# Patient Record
Sex: FEMALE | HISPANIC OR LATINO | Employment: OTHER
[De-identification: names, ages, dates, MRNs, and addresses within clinical notes are randomized per-mention and may not be internally consistent; named-entity substitution may affect disease eponyms.]

---

## 2017-01-27 ENCOUNTER — HOSPITAL (OUTPATIENT)
Dept: OTHER | Age: 62
End: 2017-01-27
Attending: INTERNAL MEDICINE

## 2017-02-04 ENCOUNTER — HOSPITAL (OUTPATIENT)
Dept: OTHER | Age: 62
End: 2017-02-04
Attending: FAMILY MEDICINE

## 2017-02-15 ENCOUNTER — HOSPITAL (OUTPATIENT)
Dept: OTHER | Age: 62
End: 2017-02-15
Attending: FAMILY MEDICINE

## 2017-04-11 ENCOUNTER — HOSPITAL (OUTPATIENT)
Dept: OTHER | Age: 62
End: 2017-04-11
Attending: FAMILY MEDICINE

## 2017-07-29 ENCOUNTER — HOSPITAL (OUTPATIENT)
Dept: OTHER | Age: 62
End: 2017-07-29
Attending: FAMILY MEDICINE

## 2017-10-18 ENCOUNTER — CHARTING TRANS (OUTPATIENT)
Dept: OTHER | Age: 62
End: 2017-10-18

## 2017-10-18 ASSESSMENT — PAIN SCALES - GENERAL: PAINLEVEL_OUTOF10: 0

## 2017-11-17 ENCOUNTER — IMAGING SERVICES (OUTPATIENT)
Dept: OTHER | Age: 62
End: 2017-11-17

## 2017-11-17 ENCOUNTER — HOSPITAL (OUTPATIENT)
Dept: OTHER | Age: 62
End: 2017-11-17
Attending: SURGERY

## 2017-11-22 ENCOUNTER — IMAGING SERVICES (OUTPATIENT)
Dept: OTHER | Age: 62
End: 2017-11-22

## 2017-11-22 ENCOUNTER — HOSPITAL (OUTPATIENT)
Dept: OTHER | Age: 62
End: 2017-11-22
Attending: SURGERY

## 2017-11-29 ENCOUNTER — HOSPITAL (OUTPATIENT)
Dept: OTHER | Age: 62
End: 2017-11-29
Attending: RADIOLOGY

## 2017-11-29 ENCOUNTER — CHARTING TRANS (OUTPATIENT)
Dept: OTHER | Age: 62
End: 2017-11-29

## 2017-11-29 ASSESSMENT — PAIN SCALES - GENERAL: PAINLEVEL_OUTOF10: 0

## 2017-12-01 ENCOUNTER — HOSPITAL (OUTPATIENT)
Dept: OTHER | Age: 62
End: 2017-12-01
Attending: INTERNAL MEDICINE

## 2017-12-01 ENCOUNTER — IMAGING SERVICES (OUTPATIENT)
Dept: OTHER | Age: 62
End: 2017-12-01

## 2017-12-01 ENCOUNTER — LAB SERVICES (OUTPATIENT)
Dept: OTHER | Age: 62
End: 2017-12-01

## 2017-12-01 ENCOUNTER — HOSPITAL (OUTPATIENT)
Dept: OTHER | Age: 62
End: 2017-12-01
Attending: SURGERY

## 2017-12-01 LAB
CREAT BLD-MCNC: 0.7 MG/DL (ref 0.51–0.95)
CREATININE, POC: 0.7 MG/DL (ref 0.51–0.95)

## 2017-12-06 ENCOUNTER — CHARTING TRANS (OUTPATIENT)
Dept: OTHER | Age: 62
End: 2017-12-06

## 2017-12-08 ENCOUNTER — PRIOR ORIGINAL RECORDS (OUTPATIENT)
Dept: OTHER | Age: 62
End: 2017-12-08

## 2017-12-14 ENCOUNTER — IMAGING SERVICES (OUTPATIENT)
Dept: OTHER | Age: 62
End: 2017-12-14

## 2017-12-14 ENCOUNTER — HOSPITAL (OUTPATIENT)
Dept: OTHER | Age: 62
End: 2017-12-14
Attending: SURGERY

## 2017-12-14 LAB
GLUCOSE BLDC GLUCOMTR-MCNC: 141 MG/DL (ref 65–99)
GLUCOSE BLDC GLUCOMTR-MCNC: 98 MG/DL (ref 65–99)

## 2017-12-20 ENCOUNTER — IMAGING SERVICES (OUTPATIENT)
Dept: OTHER | Age: 62
End: 2017-12-20

## 2017-12-29 ENCOUNTER — CHARTING TRANS (OUTPATIENT)
Dept: OTHER | Age: 62
End: 2017-12-29

## 2017-12-29 ASSESSMENT — PAIN SCALES - GENERAL: PAINLEVEL_OUTOF10: 6

## 2018-01-01 ENCOUNTER — HOSPITAL (OUTPATIENT)
Dept: OTHER | Age: 63
End: 2018-01-01
Attending: INTERNAL MEDICINE

## 2018-01-09 ENCOUNTER — PRIOR ORIGINAL RECORDS (OUTPATIENT)
Dept: OTHER | Age: 63
End: 2018-01-09

## 2018-01-11 ENCOUNTER — PRIOR ORIGINAL RECORDS (OUTPATIENT)
Dept: OTHER | Age: 63
End: 2018-01-11

## 2018-01-16 ENCOUNTER — PRIOR ORIGINAL RECORDS (OUTPATIENT)
Dept: OTHER | Age: 63
End: 2018-01-16

## 2018-01-18 ENCOUNTER — IMAGING SERVICES (OUTPATIENT)
Dept: OTHER | Age: 63
End: 2018-01-18

## 2018-01-18 ENCOUNTER — HOSPITAL (OUTPATIENT)
Dept: OTHER | Age: 63
End: 2018-01-18
Attending: SURGERY

## 2018-01-18 LAB — GLUCOSE BLDC GLUCOMTR-MCNC: 124 MG/DL (ref 65–99)

## 2018-01-19 ENCOUNTER — HOSPITAL (OUTPATIENT)
Dept: OTHER | Age: 63
End: 2018-01-19
Attending: INTERNAL MEDICINE

## 2018-01-19 ENCOUNTER — PRIOR ORIGINAL RECORDS (OUTPATIENT)
Dept: OTHER | Age: 63
End: 2018-01-19

## 2018-01-23 ENCOUNTER — PRIOR ORIGINAL RECORDS (OUTPATIENT)
Dept: OTHER | Age: 63
End: 2018-01-23

## 2018-01-23 LAB
ALBUMIN SERPL-MCNC: 3.5 GM/DL (ref 3.6–5.1)
ALBUMIN/GLOB SERPL: 1 {RATIO} (ref 1–2.4)
ALP SERPL-CCNC: 84 UNIT/L (ref 45–117)
ALT SERPL-CCNC: 29 UNIT/L
ANALYZER ANC (IANC): NORMAL
ANION GAP SERPL CALC-SCNC: 15 MMOL/L (ref 10–20)
AST SERPL-CCNC: 22 UNIT/L
BASOPHILS # BLD: 0 THOUSAND/MCL (ref 0–0.3)
BASOPHILS NFR BLD: 0 %
BILIRUB SERPL-MCNC: 0.4 MG/DL (ref 0.2–1)
BUN SERPL-MCNC: 11 MG/DL (ref 6–20)
BUN/CREAT SERPL: 18 (ref 7–25)
CALCIUM SERPL-MCNC: 9 MG/DL (ref 8.4–10.2)
CHLORIDE: 103 MMOL/L (ref 98–107)
CO2 SERPL-SCNC: 25 MMOL/L (ref 21–32)
CREAT SERPL-MCNC: 0.61 MG/DL (ref 0.51–0.95)
DIFFERENTIAL METHOD BLD: NORMAL
EOSINOPHIL # BLD: 0.1 THOUSAND/MCL (ref 0.1–0.5)
EOSINOPHIL NFR BLD: 2 %
ERYTHROCYTE [DISTWIDTH] IN BLOOD: 13 % (ref 11–15)
GLOBULIN SER-MCNC: 3.6 GM/DL (ref 2–4)
GLUCOSE SERPL-MCNC: 234 MG/DL (ref 65–99)
HEMATOCRIT: 37.9 % (ref 36–46.5)
HGB BLD-MCNC: 12.5 GM/DL (ref 12–15.5)
LYMPHOCYTES # BLD: 2 THOUSAND/MCL (ref 1–4)
LYMPHOCYTES NFR BLD: 37 %
MCH RBC QN AUTO: 29.8 PG (ref 26–34)
MCHC RBC AUTO-ENTMCNC: 33 GM/DL (ref 32–36.5)
MCV RBC AUTO: 90.5 FL (ref 78–100)
MONOCYTES # BLD: 0.4 THOUSAND/MCL (ref 0.3–0.9)
MONOCYTES NFR BLD: 8 %
NEUTROPHILS # BLD: 2.8 THOUSAND/MCL (ref 1.8–7.7)
NEUTROPHILS NFR BLD: 53 %
NEUTS SEG NFR BLD: NORMAL %
PERCENT NRBC: NORMAL
PLATELET # BLD: 268 THOUSAND/MCL (ref 140–450)
POTASSIUM SERPL-SCNC: 3.4 MMOL/L (ref 3.4–5.1)
PROT SERPL-MCNC: 7.1 GM/DL (ref 6.4–8.2)
RBC # BLD: 4.19 MILLION/MCL (ref 4–5.2)
SODIUM SERPL-SCNC: 140 MMOL/L (ref 135–145)
WBC # BLD: 5.3 THOUSAND/MCL (ref 4.2–11)

## 2018-02-01 ENCOUNTER — HOSPITAL (OUTPATIENT)
Dept: OTHER | Age: 63
End: 2018-02-01
Attending: INTERNAL MEDICINE

## 2018-02-06 ENCOUNTER — PRIOR ORIGINAL RECORDS (OUTPATIENT)
Dept: OTHER | Age: 63
End: 2018-02-06

## 2018-02-06 LAB
ALBUMIN SERPL-MCNC: 3.2 GM/DL (ref 3.6–5.1)
ALBUMIN/GLOB SERPL: 0.9 {RATIO} (ref 1–2.4)
ALP SERPL-CCNC: 108 UNIT/L (ref 45–117)
ALT SERPL-CCNC: 26 UNIT/L
ANALYZER ANC (IANC): ABNORMAL
ANION GAP SERPL CALC-SCNC: 16 MMOL/L (ref 10–20)
AST SERPL-CCNC: 16 UNIT/L
BASOPHILS # BLD: 0 THOUSAND/MCL (ref 0–0.3)
BASOPHILS NFR BLD: 0 %
BILIRUB SERPL-MCNC: 0.1 MG/DL (ref 0.2–1)
BUN SERPL-MCNC: 9 MG/DL (ref 6–20)
BUN/CREAT SERPL: 15 (ref 7–25)
CALCIUM SERPL-MCNC: 8.5 MG/DL (ref 8.4–10.2)
CHLORIDE: 104 MMOL/L (ref 98–107)
CO2 SERPL-SCNC: 24 MMOL/L (ref 21–32)
CREAT SERPL-MCNC: 0.6 MG/DL (ref 0.51–0.95)
DIFFERENTIAL METHOD BLD: ABNORMAL
EOSINOPHIL # BLD: 0 THOUSAND/MCL (ref 0.1–0.5)
EOSINOPHIL NFR BLD: 0 %
ERYTHROCYTE [DISTWIDTH] IN BLOOD: 12.8 % (ref 11–15)
GLOBULIN SER-MCNC: 3.4 GM/DL (ref 2–4)
GLUCOSE SERPL-MCNC: 285 MG/DL (ref 65–99)
HEMATOCRIT: 34.7 % (ref 36–46.5)
HGB BLD-MCNC: 11.4 GM/DL (ref 12–15.5)
LYMPHOCYTES # BLD: 1.5 THOUSAND/MCL (ref 1–4)
LYMPHOCYTES NFR BLD: 21 %
MCH RBC QN AUTO: 30.1 PG (ref 26–34)
MCHC RBC AUTO-ENTMCNC: 32.9 GM/DL (ref 32–36.5)
MCV RBC AUTO: 91.6 FL (ref 78–100)
METAMYELOCYTES NFR BLD: 8 % (ref 0–2)
MONOCYTES # BLD: 0.4 THOUSAND/MCL (ref 0.3–0.9)
MONOCYTES NFR BLD: 6 %
MYELOCYTES NFR BLD: 5 %
NEUTROPHILS # BLD: 4.4 THOUSAND/MCL (ref 1.8–7.7)
NEUTS BAND NFR BLD: 10 % (ref 0–10)
NEUTS SEG NFR BLD: 50 %
PATH REV BLD -IMP: ABNORMAL
PLATELET # BLD: 256 THOUSAND/MCL (ref 140–450)
POTASSIUM SERPL-SCNC: 3.7 MMOL/L (ref 3.4–5.1)
PROT SERPL-MCNC: 6.6 GM/DL (ref 6.4–8.2)
RBC # BLD: 3.79 MILLION/MCL (ref 4–5.2)
SODIUM SERPL-SCNC: 140 MMOL/L (ref 135–145)
WBC # BLD: 7.3 THOUSAND/MCL (ref 4.2–11)

## 2018-02-20 ENCOUNTER — PRIOR ORIGINAL RECORDS (OUTPATIENT)
Dept: OTHER | Age: 63
End: 2018-02-20

## 2018-02-20 LAB
ALBUMIN SERPL-MCNC: 3.3 GM/DL (ref 3.6–5.1)
ALBUMIN/GLOB SERPL: 1 {RATIO} (ref 1–2.4)
ALP SERPL-CCNC: 111 UNIT/L (ref 45–117)
ALT SERPL-CCNC: 33 UNIT/L
ANALYZER ANC (IANC): ABNORMAL
ANION GAP SERPL CALC-SCNC: 17 MMOL/L (ref 10–20)
AST SERPL-CCNC: 21 UNIT/L
BASOPHILS # BLD: 0 THOUSAND/MCL (ref 0–0.3)
BASOPHILS NFR BLD: 0 %
BILIRUB SERPL-MCNC: 0.2 MG/DL (ref 0.2–1)
BUN SERPL-MCNC: 13 MG/DL (ref 6–20)
BUN/CREAT SERPL: 23 (ref 7–25)
CALCIUM SERPL-MCNC: 8.8 MG/DL (ref 8.4–10.2)
CHLORIDE: 102 MMOL/L (ref 98–107)
CO2 SERPL-SCNC: 24 MMOL/L (ref 21–32)
CREAT SERPL-MCNC: 0.57 MG/DL (ref 0.51–0.95)
DIFFERENTIAL METHOD BLD: ABNORMAL
EOSINOPHIL # BLD: 0 THOUSAND/MCL (ref 0.1–0.5)
EOSINOPHIL NFR BLD: 0 %
ERYTHROCYTE [DISTWIDTH] IN BLOOD: 13.4 % (ref 11–15)
GLOBULIN SER-MCNC: 3.4 GM/DL (ref 2–4)
GLUCOSE SERPL-MCNC: 209 MG/DL (ref 65–99)
HEMATOCRIT: 33.7 % (ref 36–46.5)
HGB BLD-MCNC: 11.1 GM/DL (ref 12–15.5)
LARGE PLATELETS (PLTL): PRESENT
LYMPHOCYTES # BLD: 1.5 THOUSAND/MCL (ref 1–4)
LYMPHOCYTES NFR BLD: 15 %
MCH RBC QN AUTO: 29.5 PG (ref 26–34)
MCHC RBC AUTO-ENTMCNC: 32.9 GM/DL (ref 32–36.5)
MCV RBC AUTO: 89.6 FL (ref 78–100)
METAMYELOCYTES NFR BLD: 1 % (ref 0–2)
MONOCYTES # BLD: 0.8 THOUSAND/MCL (ref 0.3–0.9)
MONOCYTES NFR BLD: 8 %
MYELOCYTES NFR BLD: 8 %
NEUTROPHILS # BLD: 6.8 THOUSAND/MCL (ref 1.8–7.7)
NEUTS BAND NFR BLD: 4 % (ref 0–10)
NEUTS SEG NFR BLD: 64 %
PATH REV BLD -IMP: ABNORMAL
PLATELET # BLD: 201 THOUSAND/MCL (ref 140–450)
POLYCHROMASIA (POLY): ABNORMAL
POTASSIUM SERPL-SCNC: 3.5 MMOL/L (ref 3.4–5.1)
PROT SERPL-MCNC: 6.7 GM/DL (ref 6.4–8.2)
RBC # BLD: 3.76 MILLION/MCL (ref 4–5.2)
SODIUM SERPL-SCNC: 139 MMOL/L (ref 135–145)
TEAR DROP CELLS (TEARD): ABNORMAL
TOXIC GRANULATION (TOXIC): PRESENT
TOXIC VACUOLATION (TOXV): PRESENT
WBC # BLD: 10 THOUSAND/MCL (ref 4.2–11)

## 2018-03-01 ENCOUNTER — HOSPITAL (OUTPATIENT)
Dept: OTHER | Age: 63
End: 2018-03-01
Attending: INTERNAL MEDICINE

## 2018-03-06 ENCOUNTER — PRIOR ORIGINAL RECORDS (OUTPATIENT)
Dept: OTHER | Age: 63
End: 2018-03-06

## 2018-03-06 LAB
ALBUMIN SERPL-MCNC: 3.2 GM/DL (ref 3.6–5.1)
ALBUMIN/GLOB SERPL: 1 {RATIO} (ref 1–2.4)
ALP SERPL-CCNC: 118 UNIT/L (ref 45–117)
ALT SERPL-CCNC: 32 UNIT/L
ANALYZER ANC (IANC): ABNORMAL
ANION GAP SERPL CALC-SCNC: 15 MMOL/L (ref 10–20)
AST SERPL-CCNC: 21 UNIT/L
BASOPHILS # BLD: 0 THOUSAND/MCL (ref 0–0.3)
BASOPHILS NFR BLD: 0 %
BILIRUB SERPL-MCNC: 0.2 MG/DL (ref 0.2–1)
BUN SERPL-MCNC: 7 MG/DL (ref 6–20)
BUN/CREAT SERPL: 13 (ref 7–25)
CALCIUM SERPL-MCNC: 8.5 MG/DL (ref 8.4–10.2)
CHLORIDE: 105 MMOL/L (ref 98–107)
CO2 SERPL-SCNC: 25 MMOL/L (ref 21–32)
CREAT SERPL-MCNC: 0.53 MG/DL (ref 0.51–0.95)
DIFFERENTIAL METHOD BLD: ABNORMAL
EOSINOPHIL # BLD: 0 THOUSAND/MCL (ref 0.1–0.5)
EOSINOPHIL NFR BLD: 0 %
ERYTHROCYTE [DISTWIDTH] IN BLOOD: 15.5 % (ref 11–15)
GLOBULIN SER-MCNC: 3.1 GM/DL (ref 2–4)
GLUCOSE SERPL-MCNC: 235 MG/DL (ref 65–99)
HEMATOCRIT: 32.1 % (ref 36–46.5)
HGB BLD-MCNC: 10.5 GM/DL (ref 12–15.5)
LYMPHOCYTES # BLD: 0.8 THOUSAND/MCL (ref 1–4)
LYMPHOCYTES NFR BLD: 9 %
MCH RBC QN AUTO: 30.2 PG (ref 26–34)
MCHC RBC AUTO-ENTMCNC: 32.7 GM/DL (ref 32–36.5)
MCV RBC AUTO: 92.2 FL (ref 78–100)
METAMYELOCYTES NFR BLD: 5 % (ref 0–2)
MONOCYTES # BLD: 0.8 THOUSAND/MCL (ref 0.3–0.9)
MONOCYTES NFR BLD: 9 %
MYELOCYTES NFR BLD: 8 %
NEUTROPHILS # BLD: 6.1 THOUSAND/MCL (ref 1.8–7.7)
NEUTS BAND NFR BLD: 8 % (ref 0–10)
NEUTS SEG NFR BLD: 61 %
PATH REV BLD -IMP: ABNORMAL
PLATELET # BLD: 184 THOUSAND/MCL (ref 140–450)
POLYCHROMASIA (POLY): ABNORMAL
POTASSIUM SERPL-SCNC: 3.7 MMOL/L (ref 3.4–5.1)
PROT SERPL-MCNC: 6.3 GM/DL (ref 6.4–8.2)
RBC # BLD: 3.48 MILLION/MCL (ref 4–5.2)
SODIUM SERPL-SCNC: 141 MMOL/L (ref 135–145)
WBC # BLD: 8.9 THOUSAND/MCL (ref 4.2–11)

## 2018-03-20 ENCOUNTER — PRIOR ORIGINAL RECORDS (OUTPATIENT)
Dept: OTHER | Age: 63
End: 2018-03-20

## 2018-03-20 LAB
ALBUMIN SERPL-MCNC: 3.4 GM/DL (ref 3.6–5.1)
ALBUMIN/GLOB SERPL: 1.1 {RATIO} (ref 1–2.4)
ALP SERPL-CCNC: 136 UNIT/L (ref 45–117)
ALT SERPL-CCNC: 29 UNIT/L
ANALYZER ANC (IANC): ABNORMAL
ANION GAP SERPL CALC-SCNC: 16 MMOL/L (ref 10–20)
AST SERPL-CCNC: 21 UNIT/L
BASOPHILS # BLD: 0 THOUSAND/MCL (ref 0–0.3)
BASOPHILS NFR BLD: 0 %
BILIRUB SERPL-MCNC: 0.3 MG/DL (ref 0.2–1)
BUN SERPL-MCNC: 12 MG/DL (ref 6–20)
BUN/CREAT SERPL: 19 (ref 7–25)
CALCIUM SERPL-MCNC: 8.6 MG/DL (ref 8.4–10.2)
CHLORIDE: 104 MMOL/L (ref 98–107)
CO2 SERPL-SCNC: 24 MMOL/L (ref 21–32)
CREAT SERPL-MCNC: 0.63 MG/DL (ref 0.51–0.95)
DIFFERENTIAL METHOD BLD: ABNORMAL
EOSINOPHIL # BLD: 0.2 THOUSAND/MCL (ref 0.1–0.5)
EOSINOPHIL NFR BLD: 2 %
ERYTHROCYTE [DISTWIDTH] IN BLOOD: 17.7 % (ref 11–15)
GLOBULIN SER-MCNC: 3.1 GM/DL (ref 2–4)
GLUCOSE SERPL-MCNC: 208 MG/DL (ref 65–99)
HEMATOCRIT: 30.6 % (ref 36–46.5)
HGB BLD-MCNC: 10 GM/DL (ref 12–15.5)
LARGE PLATELETS (PLTL): PRESENT
LYMPHOCYTES # BLD: 1.1 THOUSAND/MCL (ref 1–4)
LYMPHOCYTES NFR BLD: 9 %
MACROCYTOSIS (MACRO): ABNORMAL
MCH RBC QN AUTO: 30.5 PG (ref 26–34)
MCHC RBC AUTO-ENTMCNC: 32.7 GM/DL (ref 32–36.5)
MCV RBC AUTO: 93.3 FL (ref 78–100)
METAMYELOCYTES NFR BLD: 8 % (ref 0–2)
MICROCYTOSIS (MICY): ABNORMAL
MONOCYTES # BLD: 0.6 THOUSAND/MCL (ref 0.3–0.9)
MONOCYTES NFR BLD: 5 %
MYELOCYTES NFR BLD: 7 %
NEUTROPHILS # BLD: 8.5 THOUSAND/MCL (ref 1.8–7.7)
NEUTS BAND NFR BLD: 17 % (ref 0–10)
NEUTS SEG NFR BLD: 52 %
NRBC BLD MANUAL-RTO: 2 /100 WBC
PATH REV BLD -IMP: ABNORMAL
PLATELET # BLD: 197 THOUSAND/MCL (ref 140–450)
POLYCHROMASIA (POLY): ABNORMAL
POTASSIUM SERPL-SCNC: 3.5 MMOL/L (ref 3.4–5.1)
PROT SERPL-MCNC: 6.5 GM/DL (ref 6.4–8.2)
RBC # BLD: 3.28 MILLION/MCL (ref 4–5.2)
SODIUM SERPL-SCNC: 140 MMOL/L (ref 135–145)
TEAR DROP CELLS (TEARD): ABNORMAL
TOXIC GRANULATION (TOXIC): PRESENT
TOXIC VACUOLATION (TOXV): PRESENT
WBC # BLD: 12.3 THOUSAND/MCL (ref 4.2–11)

## 2018-03-26 ENCOUNTER — CHARTING TRANS (OUTPATIENT)
Dept: OTHER | Age: 63
End: 2018-03-26

## 2018-03-26 ASSESSMENT — PAIN SCALES - GENERAL: PAINLEVEL_OUTOF10: 0

## 2018-04-01 ENCOUNTER — HOSPITAL (OUTPATIENT)
Dept: OTHER | Age: 63
End: 2018-04-01
Attending: INTERNAL MEDICINE

## 2018-04-03 ENCOUNTER — PRIOR ORIGINAL RECORDS (OUTPATIENT)
Dept: OTHER | Age: 63
End: 2018-04-03

## 2018-04-03 LAB
ALBUMIN SERPL-MCNC: 3.2 GM/DL (ref 3.6–5.1)
ALBUMIN/GLOB SERPL: 0.9 {RATIO} (ref 1–2.4)
ALP SERPL-CCNC: 126 UNIT/L (ref 45–117)
ALT SERPL-CCNC: 47 UNIT/L
AMORPH SED URNS QL MICRO: ABNORMAL
ANALYZER ANC (IANC): ABNORMAL
ANION GAP SERPL CALC-SCNC: 16 MMOL/L (ref 10–20)
APPEARANCE UR: CLEAR
AST SERPL-CCNC: 34 UNIT/L
BASOPHILS # BLD: 0 THOUSAND/MCL (ref 0–0.3)
BASOPHILS NFR BLD: 0 %
BILIRUB SERPL-MCNC: 0.4 MG/DL (ref 0.2–1)
BILIRUB UR QL: NEGATIVE
BUN SERPL-MCNC: 12 MG/DL (ref 6–20)
BUN/CREAT SERPL: 21 (ref 7–25)
CALCIUM SERPL-MCNC: 9 MG/DL (ref 8.4–10.2)
CAOX CRY URNS QL MICRO: ABNORMAL
CHLORIDE: 103 MMOL/L (ref 98–107)
CO2 SERPL-SCNC: 25 MMOL/L (ref 21–32)
COLOR UR: ABNORMAL
CREAT SERPL-MCNC: 0.56 MG/DL (ref 0.51–0.95)
DIFFERENTIAL METHOD BLD: ABNORMAL
EOSINOPHIL # BLD: 0.1 THOUSAND/MCL (ref 0.1–0.5)
EOSINOPHIL NFR BLD: 1 %
EPITH CASTS #/AREA URNS LPF: ABNORMAL /[LPF]
ERYTHROCYTE [DISTWIDTH] IN BLOOD: 18.4 % (ref 11–15)
FATTY CASTS #/AREA URNS LPF: ABNORMAL /[LPF]
GLOBULIN SER-MCNC: 3.5 GM/DL (ref 2–4)
GLUCOSE SERPL-MCNC: 234 MG/DL (ref 65–99)
GLUCOSE UR-MCNC: NEGATIVE MG/DL
GRAN CASTS #/AREA URNS LPF: ABNORMAL /[LPF]
HEMATOCRIT: 30.8 % (ref 36–46.5)
HGB BLD-MCNC: 10 GM/DL (ref 12–15.5)
HGB UR QL: NEGATIVE
HYALINE CASTS #/AREA URNS LPF: ABNORMAL /[LPF]
KETONES UR-MCNC: ABNORMAL MG/DL
LARGE PLATELETS (PLTL): PRESENT
LEUKOCYTE ESTERASE UR QL STRIP: NEGATIVE
LYMPHOCYTES # BLD: 1.3 THOUSAND/MCL (ref 1–4)
LYMPHOCYTES NFR BLD: 12 %
MCH RBC QN AUTO: 30.8 PG (ref 26–34)
MCHC RBC AUTO-ENTMCNC: 32.5 GM/DL (ref 32–36.5)
MCV RBC AUTO: 94.8 FL (ref 78–100)
METAMYELOCYTES NFR BLD: 5 % (ref 0–2)
MICROSCOPIC (MT): ABNORMAL
MIXED CELL CASTS #/AREA URNS LPF: ABNORMAL /[LPF]
MONOCYTES # BLD: 0.7 THOUSAND/MCL (ref 0.3–0.9)
MONOCYTES NFR BLD: 6 %
MUCOUS THREADS URNS QL MICRO: ABNORMAL
MYELOCYTES NFR BLD: 3 %
NEUTROPHILS # BLD: 8.1 THOUSAND/MCL (ref 1.8–7.7)
NEUTS BAND NFR BLD: 8 % (ref 0–10)
NEUTS SEG NFR BLD: 65 %
NITRITE UR QL: NEGATIVE
NRBC BLD MANUAL-RTO: 1 /100 WBC
PATH REV BLD -IMP: ABNORMAL
PERCENT NRBC: ABNORMAL
PH UR: 6 UNIT (ref 5–7)
PLATELET # BLD: 256 THOUSAND/MCL (ref 140–450)
POLYCHROMASIA (POLY): ABNORMAL
POTASSIUM SERPL-SCNC: 3.6 MMOL/L (ref 3.4–5.1)
PROT SERPL-MCNC: 6.7 GM/DL (ref 6.4–8.2)
PROT UR QL: NEGATIVE MG/DL
RBC # BLD: 3.25 MILLION/MCL (ref 4–5.2)
RBC CASTS #/AREA URNS LPF: ABNORMAL /[LPF]
RENAL EPI CELLS #/AREA URNS HPF: ABNORMAL /[HPF]
SODIUM SERPL-SCNC: 140 MMOL/L (ref 135–145)
SP GR UR: 1.01 (ref 1–1.03)
SPECIMEN SOURCE: ABNORMAL
SPERM URNS QL MICRO: ABNORMAL
T VAGINALIS URNS QL MICRO: ABNORMAL
TEAR DROP CELLS (TEARD): ABNORMAL
TRI-PHOS CRY URNS QL MICRO: ABNORMAL
URATE CRY URNS QL MICRO: ABNORMAL
URNS CMNT MICRO: ABNORMAL
UROBILINOGEN UR QL: 0.2 MG/DL (ref 0–1)
WAXY CASTS #/AREA URNS LPF: ABNORMAL /[LPF]
WBC # BLD: 11.1 THOUSAND/MCL (ref 4.2–11)
WBC CASTS #/AREA URNS LPF: ABNORMAL /[LPF]
YEAST HYPHAE URNS QL MICRO: ABNORMAL
YEAST URNS QL MICRO: ABNORMAL

## 2018-04-11 ENCOUNTER — PRIOR ORIGINAL RECORDS (OUTPATIENT)
Dept: OTHER | Age: 63
End: 2018-04-11

## 2018-04-11 ENCOUNTER — HOSPITAL (OUTPATIENT)
Dept: OTHER | Age: 63
End: 2018-04-11
Attending: RADIOLOGY

## 2018-04-17 ENCOUNTER — PRIOR ORIGINAL RECORDS (OUTPATIENT)
Dept: OTHER | Age: 63
End: 2018-04-17

## 2018-04-17 LAB
ALBUMIN SERPL-MCNC: 3.2 GM/DL (ref 3.6–5.1)
ALBUMIN/GLOB SERPL: 0.8 {RATIO} (ref 1–2.4)
ALP SERPL-CCNC: 132 UNIT/L (ref 45–117)
ALT SERPL-CCNC: 45 UNIT/L
ANALYZER ANC (IANC): ABNORMAL
ANION GAP SERPL CALC-SCNC: 17 MMOL/L (ref 10–20)
AST SERPL-CCNC: 31 UNIT/L
BASOPHILS # BLD: 0 THOUSAND/MCL (ref 0–0.3)
BASOPHILS NFR BLD: 0 %
BILIRUB SERPL-MCNC: 0.4 MG/DL (ref 0.2–1)
BUN SERPL-MCNC: 10 MG/DL (ref 6–20)
BUN/CREAT SERPL: 18 (ref 7–25)
CALCIUM SERPL-MCNC: 8.9 MG/DL (ref 8.4–10.2)
CHLORIDE: 103 MMOL/L (ref 98–107)
CO2 SERPL-SCNC: 23 MMOL/L (ref 21–32)
CREAT SERPL-MCNC: 0.56 MG/DL (ref 0.51–0.95)
DIFFERENTIAL METHOD BLD: ABNORMAL
EOSINOPHIL # BLD: 0.3 THOUSAND/MCL (ref 0.1–0.5)
EOSINOPHIL NFR BLD: 3 %
ERYTHROCYTE [DISTWIDTH] IN BLOOD: 17.6 % (ref 11–15)
GLOBULIN SER-MCNC: 3.8 GM/DL (ref 2–4)
GLUCOSE SERPL-MCNC: 249 MG/DL (ref 65–99)
HEMATOCRIT: 31.3 % (ref 36–46.5)
HGB BLD-MCNC: 10.1 GM/DL (ref 12–15.5)
LYMPHOCYTES # BLD: 0.8 THOUSAND/MCL (ref 1–4)
LYMPHOCYTES NFR BLD: 9 %
MCH RBC QN AUTO: 31.3 PG (ref 26–34)
MCHC RBC AUTO-ENTMCNC: 32.3 GM/DL (ref 32–36.5)
MCV RBC AUTO: 96.9 FL (ref 78–100)
METAMYELOCYTES NFR BLD: 1 % (ref 0–2)
MONOCYTES # BLD: 0.8 THOUSAND/MCL (ref 0.3–0.9)
MONOCYTES NFR BLD: 9 %
MYELOCYTES NFR BLD: 2 %
NEUTROPHILS # BLD: 6.5 THOUSAND/MCL (ref 1.8–7.7)
NEUTS BAND NFR BLD: 3 % (ref 0–10)
NEUTS SEG NFR BLD: 73 %
PATH REV BLD -IMP: ABNORMAL
PERCENT NRBC: ABNORMAL
PLATELET # BLD: 292 THOUSAND/MCL (ref 140–450)
POLYCHROMASIA (POLY): ABNORMAL
POTASSIUM SERPL-SCNC: 3.6 MMOL/L (ref 3.4–5.1)
PROT SERPL-MCNC: 7 GM/DL (ref 6.4–8.2)
RBC # BLD: 3.23 MILLION/MCL (ref 4–5.2)
SODIUM SERPL-SCNC: 139 MMOL/L (ref 135–145)
WBC # BLD: 8.6 THOUSAND/MCL (ref 4.2–11)

## 2018-05-01 ENCOUNTER — HOSPITAL (OUTPATIENT)
Dept: OTHER | Age: 63
End: 2018-05-01
Attending: RADIOLOGY

## 2018-05-01 ENCOUNTER — PRIOR ORIGINAL RECORDS (OUTPATIENT)
Dept: OTHER | Age: 63
End: 2018-05-01

## 2018-05-01 ENCOUNTER — HOSPITAL (OUTPATIENT)
Dept: OTHER | Age: 63
End: 2018-05-01
Attending: INTERNAL MEDICINE

## 2018-05-01 LAB
ALBUMIN SERPL-MCNC: 3.3 GM/DL (ref 3.6–5.1)
ALBUMIN/GLOB SERPL: 0.9 {RATIO} (ref 1–2.4)
ALP SERPL-CCNC: 133 UNIT/L (ref 45–117)
ALT SERPL-CCNC: 54 UNIT/L
ANALYZER ANC (IANC): ABNORMAL
ANION GAP SERPL CALC-SCNC: 15 MMOL/L (ref 10–20)
AST SERPL-CCNC: 39 UNIT/L
BASOPHILS # BLD: 0.1 THOUSAND/MCL (ref 0–0.3)
BASOPHILS NFR BLD: 1 %
BILIRUB SERPL-MCNC: 0.4 MG/DL (ref 0.2–1)
BUN SERPL-MCNC: 12 MG/DL (ref 6–20)
BUN/CREAT SERPL: 18 (ref 7–25)
CALCIUM SERPL-MCNC: 9.2 MG/DL (ref 8.4–10.2)
CHLORIDE: 103 MMOL/L (ref 98–107)
CO2 SERPL-SCNC: 26 MMOL/L (ref 21–32)
CREAT SERPL-MCNC: 0.66 MG/DL (ref 0.51–0.95)
DIFFERENTIAL METHOD BLD: ABNORMAL
EOSINOPHIL # BLD: 0.1 THOUSAND/MCL (ref 0.1–0.5)
EOSINOPHIL NFR BLD: 1 %
ERYTHROCYTE [DISTWIDTH] IN BLOOD: 16.1 % (ref 11–15)
GLOBULIN SER-MCNC: 3.8 GM/DL (ref 2–4)
GLUCOSE SERPL-MCNC: 155 MG/DL (ref 65–99)
HEMATOCRIT: 31.5 % (ref 36–46.5)
HGB BLD-MCNC: 10.1 GM/DL (ref 12–15.5)
LYMPHOCYTES # BLD: 1 THOUSAND/MCL (ref 1–4)
LYMPHOCYTES NFR BLD: 15 %
MCH RBC QN AUTO: 31.1 PG (ref 26–34)
MCHC RBC AUTO-ENTMCNC: 32.1 GM/DL (ref 32–36.5)
MCV RBC AUTO: 96.9 FL (ref 78–100)
METAMYELOCYTES NFR BLD: 1 % (ref 0–2)
MONOCYTES # BLD: 0.7 THOUSAND/MCL (ref 0.3–0.9)
MONOCYTES NFR BLD: 11 %
MYELOCYTES NFR BLD: 1 %
NEUTROPHILS # BLD: 4.5 THOUSAND/MCL (ref 1.8–7.7)
NEUTS SEG NFR BLD: 70 %
PATH REV BLD -IMP: ABNORMAL
PERCENT NRBC: ABNORMAL
PLATELET # BLD: 310 THOUSAND/MCL (ref 140–450)
POLYCHROMASIA (POLY): ABNORMAL
POTASSIUM SERPL-SCNC: 3.7 MMOL/L (ref 3.4–5.1)
PROT SERPL-MCNC: 7.1 GM/DL (ref 6.4–8.2)
RBC # BLD: 3.25 MILLION/MCL (ref 4–5.2)
SODIUM SERPL-SCNC: 140 MMOL/L (ref 135–145)
WBC # BLD: 6.4 THOUSAND/MCL (ref 4.2–11)

## 2018-05-10 ENCOUNTER — HOSPITAL (OUTPATIENT)
Dept: OTHER | Age: 63
End: 2018-05-10
Attending: SURGERY

## 2018-05-10 LAB
GLUCOSE BLDC GLUCOMTR-MCNC: 143 MG/DL (ref 65–99)
GLUCOSE BLDC GLUCOMTR-MCNC: 201 MG/DL (ref 65–99)

## 2018-05-31 ENCOUNTER — PRIOR ORIGINAL RECORDS (OUTPATIENT)
Dept: OTHER | Age: 63
End: 2018-05-31

## 2018-06-01 ENCOUNTER — HOSPITAL (OUTPATIENT)
Dept: OTHER | Age: 63
End: 2018-06-01
Attending: INTERNAL MEDICINE

## 2018-06-01 ENCOUNTER — HOSPITAL (OUTPATIENT)
Dept: OTHER | Age: 63
End: 2018-06-01
Attending: RADIOLOGY

## 2018-06-06 ENCOUNTER — PRIOR ORIGINAL RECORDS (OUTPATIENT)
Dept: OTHER | Age: 63
End: 2018-06-06

## 2018-06-06 LAB
ALBUMIN SERPL-MCNC: 3.7 GM/DL (ref 3.6–5.1)
ALBUMIN/GLOB SERPL: 1.1 {RATIO} (ref 1–2.4)
ALP SERPL-CCNC: 118 UNIT/L (ref 45–117)
ALT SERPL-CCNC: 44 UNIT/L
ANALYZER ANC (IANC): ABNORMAL
ANION GAP SERPL CALC-SCNC: 12 MMOL/L (ref 10–20)
AST SERPL-CCNC: 29 UNIT/L
BASOPHILS # BLD: 0.1 THOUSAND/MCL (ref 0–0.3)
BASOPHILS NFR BLD: 1 %
BILIRUB SERPL-MCNC: 0.2 MG/DL (ref 0.2–1)
BUN SERPL-MCNC: 15 MG/DL (ref 6–20)
BUN/CREAT SERPL: 25 (ref 7–25)
CALCIUM SERPL-MCNC: 9 MG/DL (ref 8.4–10.2)
CHLORIDE: 102 MMOL/L (ref 98–107)
CO2 SERPL-SCNC: 28 MMOL/L (ref 21–32)
CREAT SERPL-MCNC: 0.61 MG/DL (ref 0.51–0.95)
DIFFERENTIAL METHOD BLD: ABNORMAL
EOSINOPHIL # BLD: 0.1 THOUSAND/MCL (ref 0.1–0.5)
EOSINOPHIL NFR BLD: 2 %
ERYTHROCYTE [DISTWIDTH] IN BLOOD: 13.3 % (ref 11–15)
GLOBULIN SER-MCNC: 3.4 GM/DL (ref 2–4)
GLUCOSE SERPL-MCNC: 153 MG/DL (ref 65–99)
HEMATOCRIT: 36.8 % (ref 36–46.5)
HGB BLD-MCNC: 11.7 GM/DL (ref 12–15.5)
LYMPHOCYTES # BLD: 1.3 THOUSAND/MCL (ref 1–4)
LYMPHOCYTES NFR BLD: 21 %
MCH RBC QN AUTO: 30.2 PG (ref 26–34)
MCHC RBC AUTO-ENTMCNC: 31.8 GM/DL (ref 32–36.5)
MCV RBC AUTO: 95.1 FL (ref 78–100)
MONOCYTES # BLD: 0.5 THOUSAND/MCL (ref 0.3–0.9)
MONOCYTES NFR BLD: 9 %
NEUTROPHILS # BLD: 4.1 THOUSAND/MCL (ref 1.8–7.7)
NEUTROPHILS NFR BLD: 67 %
NEUTS SEG NFR BLD: ABNORMAL %
NRBC (NRBCRE): ABNORMAL
PLATELET # BLD: 286 THOUSAND/MCL (ref 140–450)
POTASSIUM SERPL-SCNC: 4.3 MMOL/L (ref 3.4–5.1)
PROT SERPL-MCNC: 7.1 GM/DL (ref 6.4–8.2)
RBC # BLD: 3.87 MILLION/MCL (ref 4–5.2)
SODIUM SERPL-SCNC: 138 MMOL/L (ref 135–145)
WBC # BLD: 6.1 THOUSAND/MCL (ref 4.2–11)

## 2018-06-07 ENCOUNTER — PRIOR ORIGINAL RECORDS (OUTPATIENT)
Dept: OTHER | Age: 63
End: 2018-06-07

## 2018-06-11 ENCOUNTER — PRIOR ORIGINAL RECORDS (OUTPATIENT)
Dept: OTHER | Age: 63
End: 2018-06-11

## 2018-06-18 ENCOUNTER — PRIOR ORIGINAL RECORDS (OUTPATIENT)
Dept: OTHER | Age: 63
End: 2018-06-18

## 2018-06-25 ENCOUNTER — PRIOR ORIGINAL RECORDS (OUTPATIENT)
Dept: OTHER | Age: 63
End: 2018-06-25

## 2018-07-01 ENCOUNTER — HOSPITAL (OUTPATIENT)
Dept: OTHER | Age: 63
End: 2018-07-01
Attending: RADIOLOGY

## 2018-07-01 ENCOUNTER — HOSPITAL (OUTPATIENT)
Dept: OTHER | Age: 63
End: 2018-07-01
Attending: INTERNAL MEDICINE

## 2018-07-02 ENCOUNTER — PRIOR ORIGINAL RECORDS (OUTPATIENT)
Dept: OTHER | Age: 63
End: 2018-07-02

## 2018-07-03 ENCOUNTER — PRIOR ORIGINAL RECORDS (OUTPATIENT)
Dept: OTHER | Age: 63
End: 2018-07-03

## 2018-07-09 ENCOUNTER — PRIOR ORIGINAL RECORDS (OUTPATIENT)
Dept: OTHER | Age: 63
End: 2018-07-09

## 2018-07-31 ENCOUNTER — PRIOR ORIGINAL RECORDS (OUTPATIENT)
Dept: OTHER | Age: 63
End: 2018-07-31

## 2018-08-01 ENCOUNTER — HOSPITAL (OUTPATIENT)
Dept: OTHER | Age: 63
End: 2018-08-01
Attending: RADIOLOGY

## 2018-08-09 ENCOUNTER — PRIOR ORIGINAL RECORDS (OUTPATIENT)
Dept: OTHER | Age: 63
End: 2018-08-09

## 2018-09-01 ENCOUNTER — HOSPITAL (OUTPATIENT)
Dept: OTHER | Age: 63
End: 2018-09-01
Attending: INTERNAL MEDICINE

## 2018-09-06 ENCOUNTER — PRIOR ORIGINAL RECORDS (OUTPATIENT)
Dept: OTHER | Age: 63
End: 2018-09-06

## 2018-10-01 ENCOUNTER — HOSPITAL (OUTPATIENT)
Dept: OTHER | Age: 63
End: 2018-10-01
Attending: INTERNAL MEDICINE

## 2018-11-01 VITALS
WEIGHT: 170 LBS | BODY MASS INDEX: 30.12 KG/M2 | HEIGHT: 63 IN | DIASTOLIC BLOOD PRESSURE: 78 MMHG | OXYGEN SATURATION: 99 % | SYSTOLIC BLOOD PRESSURE: 110 MMHG | HEART RATE: 88 BPM | RESPIRATION RATE: 16 BRPM

## 2018-11-02 VITALS
BODY MASS INDEX: 30.12 KG/M2 | SYSTOLIC BLOOD PRESSURE: 120 MMHG | WEIGHT: 170 LBS | HEIGHT: 63 IN | RESPIRATION RATE: 16 BRPM | DIASTOLIC BLOOD PRESSURE: 72 MMHG

## 2018-11-02 VITALS
DIASTOLIC BLOOD PRESSURE: 80 MMHG | WEIGHT: 170 LBS | BODY MASS INDEX: 30.12 KG/M2 | HEIGHT: 63 IN | RESPIRATION RATE: 16 BRPM | SYSTOLIC BLOOD PRESSURE: 120 MMHG

## 2018-11-02 VITALS
BODY MASS INDEX: 30.12 KG/M2 | HEIGHT: 63 IN | RESPIRATION RATE: 16 BRPM | WEIGHT: 170 LBS | DIASTOLIC BLOOD PRESSURE: 80 MMHG | SYSTOLIC BLOOD PRESSURE: 130 MMHG

## 2018-11-07 ENCOUNTER — HOSPITAL (OUTPATIENT)
Dept: OTHER | Age: 63
End: 2018-11-07
Attending: RADIOLOGY

## 2018-11-13 ENCOUNTER — HOSPITAL (OUTPATIENT)
Dept: OTHER | Age: 63
End: 2018-11-13
Attending: RADIOLOGY

## 2018-11-13 ENCOUNTER — PRIOR ORIGINAL RECORDS (OUTPATIENT)
Dept: OTHER | Age: 63
End: 2018-11-13

## 2018-11-30 ENCOUNTER — HOSPITAL (OUTPATIENT)
Dept: OTHER | Age: 63
End: 2018-11-30
Attending: ORTHOPAEDIC SURGERY

## 2018-12-01 ENCOUNTER — HOSPITAL (OUTPATIENT)
Dept: OTHER | Age: 63
End: 2018-12-01
Attending: RADIOLOGY

## 2018-12-01 ENCOUNTER — PRIOR ORIGINAL RECORDS (OUTPATIENT)
Dept: HEALTH INFORMATION MANAGEMENT | Facility: OTHER | Age: 63
End: 2018-12-01

## 2019-03-05 ENCOUNTER — HOSPITAL (OUTPATIENT)
Dept: OTHER | Age: 64
End: 2019-03-05
Attending: INTERNAL MEDICINE

## 2019-03-06 ENCOUNTER — PRIOR ORIGINAL RECORDS (OUTPATIENT)
Dept: OTHER | Age: 64
End: 2019-03-06

## 2019-03-22 ENCOUNTER — HOSPITAL (OUTPATIENT)
Dept: OTHER | Age: 64
End: 2019-03-22
Attending: ORTHOPAEDIC SURGERY

## 2019-04-01 ENCOUNTER — HOSPITAL (OUTPATIENT)
Dept: OTHER | Age: 64
End: 2019-04-01
Attending: INTERNAL MEDICINE

## 2019-04-01 ENCOUNTER — HOSPITAL (OUTPATIENT)
Dept: OTHER | Age: 64
End: 2019-04-01
Attending: ORTHOPAEDIC SURGERY

## 2019-05-01 ENCOUNTER — HOSPITAL (OUTPATIENT)
Dept: OTHER | Age: 64
End: 2019-05-01
Attending: ORTHOPAEDIC SURGERY

## 2019-05-03 ENCOUNTER — HOSPITAL (OUTPATIENT)
Dept: OTHER | Age: 64
End: 2019-05-03
Attending: INTERNAL MEDICINE

## 2019-05-21 ENCOUNTER — PRIOR ORIGINAL RECORDS (OUTPATIENT)
Dept: OTHER | Age: 64
End: 2019-05-21

## 2019-05-21 ENCOUNTER — HOSPITAL (OUTPATIENT)
Dept: OTHER | Age: 64
End: 2019-05-21
Attending: RADIOLOGY

## 2019-06-01 ENCOUNTER — HOSPITAL (OUTPATIENT)
Dept: OTHER | Age: 64
End: 2019-06-01
Attending: RADIOLOGY

## 2019-06-01 ENCOUNTER — HOSPITAL (OUTPATIENT)
Dept: OTHER | Age: 64
End: 2019-06-01
Attending: ORTHOPAEDIC SURGERY

## 2019-09-11 ENCOUNTER — HOSPITAL (OUTPATIENT)
Dept: OTHER | Age: 64
End: 2019-09-11
Attending: FAMILY MEDICINE

## 2019-09-16 ENCOUNTER — HOSPITAL (OUTPATIENT)
Dept: OTHER | Age: 64
End: 2019-09-16
Attending: INTERNAL MEDICINE

## 2019-09-17 ENCOUNTER — PRIOR ORIGINAL RECORDS (OUTPATIENT)
Dept: OTHER | Age: 64
End: 2019-09-17

## 2019-10-01 ENCOUNTER — HOSPITAL (OUTPATIENT)
Dept: OTHER | Age: 64
End: 2019-10-01
Attending: INTERNAL MEDICINE

## 2019-11-14 ENCOUNTER — HOSPITAL (OUTPATIENT)
Dept: OTHER | Age: 64
End: 2019-11-14
Attending: RADIOLOGY

## 2019-11-21 ENCOUNTER — PRIOR ORIGINAL RECORDS (OUTPATIENT)
Dept: OTHER | Age: 64
End: 2019-11-21

## 2019-11-21 ENCOUNTER — HOSPITAL (OUTPATIENT)
Dept: OTHER | Age: 64
End: 2019-11-21
Attending: RADIOLOGY

## 2019-12-01 ENCOUNTER — HOSPITAL (OUTPATIENT)
Dept: OTHER | Age: 64
End: 2019-12-01
Attending: RADIOLOGY

## 2020-02-09 VITALS — HEIGHT: 63 IN | BODY MASS INDEX: 29.74 KG/M2

## 2020-02-09 VITALS
DIASTOLIC BLOOD PRESSURE: 75 MMHG | WEIGHT: 165.12 LBS | SYSTOLIC BLOOD PRESSURE: 112 MMHG | BODY MASS INDEX: 29.25 KG/M2 | RESPIRATION RATE: 18 BRPM | HEART RATE: 63 BPM

## 2020-02-09 VITALS
WEIGHT: 170.42 LBS | HEART RATE: 81 BPM | SYSTOLIC BLOOD PRESSURE: 148 MMHG | DIASTOLIC BLOOD PRESSURE: 77 MMHG | RESPIRATION RATE: 18 BRPM | BODY MASS INDEX: 30.19 KG/M2

## 2020-02-09 VITALS
RESPIRATION RATE: 16 BRPM | DIASTOLIC BLOOD PRESSURE: 84 MMHG | SYSTOLIC BLOOD PRESSURE: 145 MMHG | HEART RATE: 69 BPM | WEIGHT: 170.2 LBS | BODY MASS INDEX: 30.15 KG/M2

## 2020-02-09 VITALS
HEIGHT: 63 IN | BODY MASS INDEX: 29.73 KG/M2 | DIASTOLIC BLOOD PRESSURE: 71 MMHG | RESPIRATION RATE: 16 BRPM | HEART RATE: 69 BPM | SYSTOLIC BLOOD PRESSURE: 108 MMHG | WEIGHT: 167.77 LBS

## 2020-02-09 VITALS
HEART RATE: 67 BPM | RESPIRATION RATE: 16 BRPM | DIASTOLIC BLOOD PRESSURE: 81 MMHG | SYSTOLIC BLOOD PRESSURE: 133 MMHG | WEIGHT: 162.26 LBS

## 2020-02-09 VITALS
WEIGHT: 159.83 LBS | DIASTOLIC BLOOD PRESSURE: 60 MMHG | SYSTOLIC BLOOD PRESSURE: 107 MMHG | RESPIRATION RATE: 16 BRPM | HEART RATE: 73 BPM

## 2020-02-09 VITALS
HEART RATE: 91 BPM | BODY MASS INDEX: 29.99 KG/M2 | SYSTOLIC BLOOD PRESSURE: 138 MMHG | DIASTOLIC BLOOD PRESSURE: 84 MMHG | WEIGHT: 169.31 LBS | RESPIRATION RATE: 18 BRPM

## 2020-02-09 VITALS
SYSTOLIC BLOOD PRESSURE: 134 MMHG | WEIGHT: 163.58 LBS | BODY MASS INDEX: 28.98 KG/M2 | RESPIRATION RATE: 16 BRPM | HEART RATE: 84 BPM | DIASTOLIC BLOOD PRESSURE: 86 MMHG

## 2020-02-09 VITALS
DIASTOLIC BLOOD PRESSURE: 74 MMHG | RESPIRATION RATE: 18 BRPM | BODY MASS INDEX: 29.64 KG/M2 | WEIGHT: 167.33 LBS | SYSTOLIC BLOOD PRESSURE: 131 MMHG | HEART RATE: 67 BPM

## 2020-02-09 VITALS
HEART RATE: 92 BPM | SYSTOLIC BLOOD PRESSURE: 143 MMHG | HEIGHT: 63 IN | WEIGHT: 164.02 LBS | DIASTOLIC BLOOD PRESSURE: 83 MMHG | BODY MASS INDEX: 29.06 KG/M2 | RESPIRATION RATE: 16 BRPM

## 2020-02-09 VITALS
HEART RATE: 65 BPM | WEIGHT: 165.57 LBS | RESPIRATION RATE: 16 BRPM | SYSTOLIC BLOOD PRESSURE: 127 MMHG | HEIGHT: 63 IN | DIASTOLIC BLOOD PRESSURE: 74 MMHG | BODY MASS INDEX: 29.34 KG/M2

## 2020-02-09 VITALS
HEART RATE: 73 BPM | SYSTOLIC BLOOD PRESSURE: 120 MMHG | WEIGHT: 162.48 LBS | RESPIRATION RATE: 16 BRPM | DIASTOLIC BLOOD PRESSURE: 77 MMHG

## 2020-02-09 VITALS
RESPIRATION RATE: 18 BRPM | BODY MASS INDEX: 29.19 KG/M2 | WEIGHT: 164.79 LBS | SYSTOLIC BLOOD PRESSURE: 142 MMHG | HEART RATE: 68 BPM | DIASTOLIC BLOOD PRESSURE: 83 MMHG

## 2020-02-09 VITALS
DIASTOLIC BLOOD PRESSURE: 77 MMHG | WEIGHT: 166.23 LBS | BODY MASS INDEX: 29.45 KG/M2 | HEART RATE: 88 BPM | RESPIRATION RATE: 18 BRPM | SYSTOLIC BLOOD PRESSURE: 118 MMHG

## 2020-02-09 VITALS
SYSTOLIC BLOOD PRESSURE: 127 MMHG | WEIGHT: 163.91 LBS | BODY MASS INDEX: 29.04 KG/M2 | RESPIRATION RATE: 18 BRPM | HEART RATE: 90 BPM | DIASTOLIC BLOOD PRESSURE: 87 MMHG

## 2020-02-09 VITALS
WEIGHT: 165.12 LBS | HEIGHT: 63 IN | RESPIRATION RATE: 16 BRPM | HEART RATE: 108 BPM | DIASTOLIC BLOOD PRESSURE: 84 MMHG | SYSTOLIC BLOOD PRESSURE: 134 MMHG | BODY MASS INDEX: 29.26 KG/M2

## 2020-02-09 VITALS
DIASTOLIC BLOOD PRESSURE: 90 MMHG | RESPIRATION RATE: 16 BRPM | SYSTOLIC BLOOD PRESSURE: 129 MMHG | WEIGHT: 160.94 LBS | HEART RATE: 68 BPM

## 2020-02-09 VITALS
HEART RATE: 62 BPM | WEIGHT: 164.9 LBS | SYSTOLIC BLOOD PRESSURE: 131 MMHG | RESPIRATION RATE: 16 BRPM | BODY MASS INDEX: 29.22 KG/M2 | HEIGHT: 63 IN | DIASTOLIC BLOOD PRESSURE: 73 MMHG

## 2020-02-09 VITALS
RESPIRATION RATE: 14 BRPM | BODY MASS INDEX: 30.09 KG/M2 | WEIGHT: 169.86 LBS | HEART RATE: 68 BPM | DIASTOLIC BLOOD PRESSURE: 94 MMHG | SYSTOLIC BLOOD PRESSURE: 157 MMHG

## 2020-02-09 VITALS
SYSTOLIC BLOOD PRESSURE: 151 MMHG | DIASTOLIC BLOOD PRESSURE: 82 MMHG | WEIGHT: 169.31 LBS | RESPIRATION RATE: 16 BRPM | HEART RATE: 69 BPM | BODY MASS INDEX: 29.99 KG/M2

## 2020-02-09 VITALS
RESPIRATION RATE: 18 BRPM | SYSTOLIC BLOOD PRESSURE: 117 MMHG | BODY MASS INDEX: 28.39 KG/M2 | DIASTOLIC BLOOD PRESSURE: 79 MMHG | WEIGHT: 160.27 LBS | HEART RATE: 76 BPM

## 2020-02-09 VITALS
WEIGHT: 161.38 LBS | RESPIRATION RATE: 16 BRPM | HEART RATE: 56 BPM | SYSTOLIC BLOOD PRESSURE: 161 MMHG | DIASTOLIC BLOOD PRESSURE: 98 MMHG

## 2020-02-09 VITALS
WEIGHT: 168.65 LBS | RESPIRATION RATE: 18 BRPM | HEART RATE: 85 BPM | DIASTOLIC BLOOD PRESSURE: 77 MMHG | BODY MASS INDEX: 29.88 KG/M2 | SYSTOLIC BLOOD PRESSURE: 122 MMHG

## 2020-02-09 VITALS — HEART RATE: 62 BPM | RESPIRATION RATE: 20 BRPM | DIASTOLIC BLOOD PRESSURE: 66 MMHG | SYSTOLIC BLOOD PRESSURE: 110 MMHG

## 2020-02-09 VITALS
SYSTOLIC BLOOD PRESSURE: 133 MMHG | DIASTOLIC BLOOD PRESSURE: 83 MMHG | BODY MASS INDEX: 29.45 KG/M2 | HEART RATE: 94 BPM | RESPIRATION RATE: 18 BRPM | WEIGHT: 166.23 LBS

## 2020-02-27 ENCOUNTER — HOSPITAL (OUTPATIENT)
Dept: OTHER | Age: 65
End: 2020-02-27

## 2020-02-27 ENCOUNTER — HOSPITAL (OUTPATIENT)
Dept: OTHER | Age: 65
End: 2020-02-27
Attending: INTERNAL MEDICINE

## 2020-02-27 LAB — GLUCOSE BLDC GLUCOMTR-MCNC: 121 MG/DL (ref 70–99)

## 2020-02-28 LAB — PATHOLOGIST NAME: NORMAL

## 2020-03-11 ENCOUNTER — HOSPITAL (OUTPATIENT)
Dept: OTHER | Age: 65
End: 2020-03-11
Attending: INTERNAL MEDICINE

## 2020-03-12 ENCOUNTER — PRIOR ORIGINAL RECORDS (OUTPATIENT)
Dept: OTHER | Age: 65
End: 2020-03-12

## 2020-04-01 ENCOUNTER — HOSPITAL (OUTPATIENT)
Dept: OTHER | Age: 65
End: 2020-04-01
Attending: INTERNAL MEDICINE

## 2020-05-15 ENCOUNTER — TELEPHONE (OUTPATIENT)
Dept: RADIATION ONCOLOGY | Age: 65
End: 2020-05-15

## 2020-05-15 RX ORDER — LISINOPRIL 20 MG/1
TABLET ORAL
COMMUNITY
End: 2020-12-11 | Stop reason: DRUGHIGH

## 2020-05-15 RX ORDER — ETODOLAC 500 MG/1
TABLET, FILM COATED ORAL
COMMUNITY
Start: 2020-03-23

## 2020-05-15 RX ORDER — METFORMIN HYDROCHLORIDE 500 MG/1
TABLET, EXTENDED RELEASE ORAL
COMMUNITY
Start: 2020-03-13 | End: 2022-12-23 | Stop reason: SDUPTHER

## 2020-05-15 RX ORDER — VIT C/B6/B5/MAGNESIUM/HERB 173 50-5-6-5MG
CAPSULE ORAL
COMMUNITY

## 2020-05-15 SDOH — HEALTH STABILITY: MENTAL HEALTH: HOW OFTEN DO YOU HAVE A DRINK CONTAINING ALCOHOL?: NEVER

## 2020-05-22 ENCOUNTER — APPOINTMENT (OUTPATIENT)
Dept: RADIATION ONCOLOGY | Age: 65
End: 2020-05-22

## 2020-05-27 ENCOUNTER — HOSPITAL ENCOUNTER (OUTPATIENT)
Dept: RADIATION ONCOLOGY | Age: 65
Discharge: HOME | End: 2020-05-27

## 2020-05-27 ENCOUNTER — APPOINTMENT (OUTPATIENT)
Dept: RADIATION ONCOLOGY | Age: 65
End: 2020-05-27

## 2020-09-11 ENCOUNTER — HOSPITAL (OUTPATIENT)
Dept: OTHER | Age: 65
End: 2020-09-11
Attending: INTERNAL MEDICINE

## 2020-09-14 ENCOUNTER — PRIOR ORIGINAL RECORDS (OUTPATIENT)
Dept: OTHER | Age: 65
End: 2020-09-14

## 2020-10-05 VITALS
DIASTOLIC BLOOD PRESSURE: 72 MMHG | HEART RATE: 66 BPM | SYSTOLIC BLOOD PRESSURE: 133 MMHG | WEIGHT: 168.21 LBS | BODY MASS INDEX: 29.8 KG/M2 | RESPIRATION RATE: 16 BRPM

## 2020-11-06 ENCOUNTER — APPOINTMENT (OUTPATIENT)
Dept: MAMMOGRAPHY | Age: 65
End: 2020-11-06
Attending: INTERNAL MEDICINE

## 2020-11-16 ENCOUNTER — APPOINTMENT (OUTPATIENT)
Dept: MAMMOGRAPHY | Age: 65
End: 2020-11-16
Attending: INTERNAL MEDICINE

## 2020-12-07 ENCOUNTER — HOSPITAL ENCOUNTER (OUTPATIENT)
Dept: MAMMOGRAPHY | Age: 65
Discharge: HOME | End: 2020-12-07
Attending: INTERNAL MEDICINE

## 2020-12-07 DIAGNOSIS — Z12.39 SCREENING BREAST EXAMINATION: ICD-10-CM

## 2020-12-07 PROCEDURE — 77063 BREAST TOMOSYNTHESIS BI: CPT

## 2020-12-11 ENCOUNTER — HOSPITAL ENCOUNTER (OUTPATIENT)
Dept: RADIATION ONCOLOGY | Age: 65
Discharge: STILL ADMITTED OR ADMINISTRATIVE | End: 2020-12-11

## 2020-12-11 PROCEDURE — 99211 OFF/OP EST MAY X REQ PHY/QHP: CPT

## 2020-12-11 RX ORDER — LISINOPRIL AND HYDROCHLOROTHIAZIDE 20; 12.5 MG/1; MG/1
1 TABLET ORAL DAILY
COMMUNITY
Start: 2020-09-28

## 2020-12-11 RX ORDER — ATORVASTATIN CALCIUM 10 MG/1
TABLET, FILM COATED ORAL
COMMUNITY
Start: 2020-10-15

## 2020-12-11 RX ORDER — CLONAZEPAM 0.5 MG/1
0.5 TABLET ORAL
COMMUNITY
Start: 2020-10-23

## 2020-12-11 RX ORDER — ROPINIROLE 0.25 MG/1
0.25 TABLET, FILM COATED ORAL 3 TIMES DAILY
COMMUNITY
Start: 2020-11-16

## 2020-12-11 ASSESSMENT — ENCOUNTER SYMPTOMS
EYES NEGATIVE: 1
RESPIRATORY NEGATIVE: 1
CONSTITUTIONAL NEGATIVE: 1
GASTROINTESTINAL NEGATIVE: 1
ENDOCRINE NEGATIVE: 1
NEUROLOGICAL NEGATIVE: 1
HEMATOLOGIC/LYMPHATIC NEGATIVE: 1
PSYCHIATRIC NEGATIVE: 1

## 2021-01-22 VITALS
DIASTOLIC BLOOD PRESSURE: 86 MMHG | BODY MASS INDEX: 29.05 KG/M2 | WEIGHT: 164.02 LBS | SYSTOLIC BLOOD PRESSURE: 137 MMHG | RESPIRATION RATE: 18 BRPM | HEART RATE: 70 BPM

## 2021-03-15 ENCOUNTER — OFFICE VISIT (OUTPATIENT)
Dept: HEMATOLOGY/ONCOLOGY | Age: 66
End: 2021-03-15
Attending: INTERNAL MEDICINE

## 2021-03-15 DIAGNOSIS — Z85.3 HISTORY OF BREAST CANCER IN FEMALE: Primary | ICD-10-CM

## 2021-03-15 PROCEDURE — 99213 OFFICE O/P EST LOW 20 MIN: CPT | Performed by: INTERNAL MEDICINE

## 2021-03-15 PROCEDURE — 99211 OFF/OP EST MAY X REQ PHY/QHP: CPT

## 2021-03-15 RX ORDER — ZINC GLUCONATE 50 MG
TABLET ORAL
COMMUNITY

## 2021-03-15 RX ORDER — CHOLECALCIFEROL (VITAMIN D3) 125 MCG
CAPSULE ORAL
COMMUNITY

## 2021-03-15 ASSESSMENT — PATIENT HEALTH QUESTIONNAIRE - PHQ9
1. LITTLE INTEREST OR PLEASURE IN DOING THINGS: NOT AT ALL
SUM OF ALL RESPONSES TO PHQ9 QUESTIONS 1 AND 2: 3
CLINICAL INTERPRETATION OF PHQ2 SCORE: FURTHER SCREENING NEEDED
SUM OF ALL RESPONSES TO PHQ9 QUESTIONS 1 AND 2: 3
2. FEELING DOWN, DEPRESSED OR HOPELESS: NEARLY EVERY DAY
CLINICAL INTERPRETATION OF PHQ9 SCORE: FURTHER SCREENING NEEDED

## 2021-03-15 ASSESSMENT — PAIN SCALES - GENERAL: PAINLEVEL: 7-8

## 2021-05-26 VITALS
SYSTOLIC BLOOD PRESSURE: 146 MMHG | TEMPERATURE: 97.5 F | BODY MASS INDEX: 29.86 KG/M2 | HEART RATE: 68 BPM | HEIGHT: 63 IN | RESPIRATION RATE: 16 BRPM | OXYGEN SATURATION: 97 % | DIASTOLIC BLOOD PRESSURE: 79 MMHG | WEIGHT: 168.54 LBS

## 2021-09-16 ENCOUNTER — APPOINTMENT (OUTPATIENT)
Dept: HEMATOLOGY/ONCOLOGY | Age: 66
End: 2021-09-16
Attending: INTERNAL MEDICINE

## 2021-09-24 ENCOUNTER — OFFICE VISIT (OUTPATIENT)
Dept: HEMATOLOGY/ONCOLOGY | Age: 66
End: 2021-09-24
Attending: INTERNAL MEDICINE

## 2021-09-24 VITALS
WEIGHT: 160.6 LBS | DIASTOLIC BLOOD PRESSURE: 84 MMHG | RESPIRATION RATE: 16 BRPM | SYSTOLIC BLOOD PRESSURE: 152 MMHG | TEMPERATURE: 97.5 F | HEART RATE: 71 BPM | BODY MASS INDEX: 28.03 KG/M2 | OXYGEN SATURATION: 99 %

## 2021-09-24 DIAGNOSIS — Z85.3 HISTORY OF BREAST CANCER IN FEMALE: Primary | ICD-10-CM

## 2021-09-24 DIAGNOSIS — Z12.31 VISIT FOR SCREENING MAMMOGRAM: ICD-10-CM

## 2021-09-24 PROCEDURE — 99213 OFFICE O/P EST LOW 20 MIN: CPT | Performed by: INTERNAL MEDICINE

## 2021-09-24 PROCEDURE — 99211 OFF/OP EST MAY X REQ PHY/QHP: CPT

## 2021-09-24 ASSESSMENT — PATIENT HEALTH QUESTIONNAIRE - PHQ9
2. FEELING DOWN, DEPRESSED OR HOPELESS: NOT AT ALL
SUM OF ALL RESPONSES TO PHQ9 QUESTIONS 1 AND 2: 0
CLINICAL INTERPRETATION OF PHQ2 SCORE: NO FURTHER SCREENING NEEDED
SUM OF ALL RESPONSES TO PHQ9 QUESTIONS 1 AND 2: 0
CLINICAL INTERPRETATION OF PHQ9 SCORE: NO FURTHER SCREENING NEEDED
1. LITTLE INTEREST OR PLEASURE IN DOING THINGS: NOT AT ALL

## 2021-09-24 ASSESSMENT — PAIN SCALES - GENERAL: PAINLEVEL: 0

## 2021-10-22 ENCOUNTER — OFFICE VISIT (OUTPATIENT)
Dept: FAMILY MEDICINE CLINIC | Facility: CLINIC | Age: 66
End: 2021-10-22
Payer: MEDICARE

## 2021-10-22 VITALS
WEIGHT: 162.19 LBS | BODY MASS INDEX: 28.74 KG/M2 | DIASTOLIC BLOOD PRESSURE: 88 MMHG | HEIGHT: 63 IN | SYSTOLIC BLOOD PRESSURE: 144 MMHG | HEART RATE: 65 BPM

## 2021-10-22 DIAGNOSIS — J30.2 SEASONAL ALLERGIES: ICD-10-CM

## 2021-10-22 DIAGNOSIS — E11.65 TYPE 2 DIABETES MELLITUS WITH HYPERGLYCEMIA, WITHOUT LONG-TERM CURRENT USE OF INSULIN (HCC): Primary | ICD-10-CM

## 2021-10-22 PROCEDURE — 99204 OFFICE O/P NEW MOD 45 MIN: CPT | Performed by: FAMILY MEDICINE

## 2021-10-22 RX ORDER — METFORMIN HYDROCHLORIDE 500 MG/1
10 TABLET, EXTENDED RELEASE ORAL DAILY
COMMUNITY
Start: 2020-03-13 | End: 2021-10-22

## 2021-10-22 RX ORDER — ROPINIROLE 0.25 MG/1
TABLET, FILM COATED ORAL
COMMUNITY
Start: 2021-05-10

## 2021-10-22 RX ORDER — LEVOCETIRIZINE DIHYDROCHLORIDE 5 MG/1
5 TABLET, FILM COATED ORAL EVERY EVENING
Qty: 30 TABLET | Refills: 0 | Status: SHIPPED | OUTPATIENT
Start: 2021-10-22 | End: 2021-12-21

## 2021-10-22 RX ORDER — LISINOPRIL AND HYDROCHLOROTHIAZIDE 20; 12.5 MG/1; MG/1
1 TABLET ORAL DAILY
COMMUNITY
Start: 2020-09-28 | End: 2021-12-14

## 2021-10-22 RX ORDER — DAPAGLIFLOZIN AND METFORMIN HYDROCHLORIDE 10; 1000 MG/1; MG/1
TABLET, FILM COATED, EXTENDED RELEASE ORAL
COMMUNITY
End: 2021-12-14

## 2021-10-22 RX ORDER — ATORVASTATIN CALCIUM 20 MG/1
TABLET, FILM COATED ORAL
COMMUNITY
Start: 2021-08-27 | End: 2021-12-14

## 2021-10-22 NOTE — PROGRESS NOTES
HPI:   Alexander Pitts is a 77year old female who presents for an establish care visit. Patient has a history of type 2 diabetes, hypercholesterolemia, essential hypertension. Was recently switched by her previous PCP from Metformin to zig duo.   Does not r without murmur    ASSESSMENT AND PLAN:   Martha Walker is a 77year old female who presents for a complete physical exam.    1. Type 2 diabetes mellitus with hyperglycemia, without long-term current use of insulin ([facility] [address]  -Medical, surgical and social histor

## 2021-12-08 ENCOUNTER — APPOINTMENT (OUTPATIENT)
Dept: MAMMOGRAPHY | Age: 66
End: 2021-12-08
Attending: INTERNAL MEDICINE

## 2021-12-09 ENCOUNTER — HOSPITAL ENCOUNTER (OUTPATIENT)
Dept: MAMMOGRAPHY | Age: 66
Discharge: HOME | End: 2021-12-09
Attending: INTERNAL MEDICINE

## 2021-12-09 DIAGNOSIS — Z12.31 VISIT FOR SCREENING MAMMOGRAM: ICD-10-CM

## 2021-12-09 DIAGNOSIS — Z85.3 HISTORY OF BREAST CANCER IN FEMALE: ICD-10-CM

## 2021-12-09 PROCEDURE — 77067 SCR MAMMO BI INCL CAD: CPT

## 2021-12-14 ENCOUNTER — OFFICE VISIT (OUTPATIENT)
Dept: FAMILY MEDICINE CLINIC | Facility: CLINIC | Age: 66
End: 2021-12-14
Payer: MEDICARE

## 2021-12-14 ENCOUNTER — LAB ENCOUNTER (OUTPATIENT)
Dept: LAB | Age: 66
End: 2021-12-14
Attending: FAMILY MEDICINE
Payer: MEDICARE

## 2021-12-14 VITALS
SYSTOLIC BLOOD PRESSURE: 124 MMHG | DIASTOLIC BLOOD PRESSURE: 76 MMHG | TEMPERATURE: 98 F | WEIGHT: 158 LBS | HEIGHT: 63 IN | BODY MASS INDEX: 28 KG/M2 | HEART RATE: 64 BPM

## 2021-12-14 DIAGNOSIS — I10 ESSENTIAL HYPERTENSION: ICD-10-CM

## 2021-12-14 DIAGNOSIS — E11.65 TYPE 2 DIABETES MELLITUS WITH HYPERGLYCEMIA, WITHOUT LONG-TERM CURRENT USE OF INSULIN (HCC): Primary | ICD-10-CM

## 2021-12-14 DIAGNOSIS — E11.42 DIABETIC POLYNEUROPATHY ASSOCIATED WITH TYPE 2 DIABETES MELLITUS (HCC): ICD-10-CM

## 2021-12-14 DIAGNOSIS — E11.65 TYPE 2 DIABETES MELLITUS WITH HYPERGLYCEMIA, WITHOUT LONG-TERM CURRENT USE OF INSULIN (HCC): ICD-10-CM

## 2021-12-14 DIAGNOSIS — R52 TOTAL BODY PAIN: ICD-10-CM

## 2021-12-14 DIAGNOSIS — E78.2 MIXED HYPERLIPIDEMIA: ICD-10-CM

## 2021-12-14 PROCEDURE — 83036 HEMOGLOBIN GLYCOSYLATED A1C: CPT

## 2021-12-14 PROCEDURE — 80053 COMPREHEN METABOLIC PANEL: CPT

## 2021-12-14 PROCEDURE — 80061 LIPID PANEL: CPT

## 2021-12-14 PROCEDURE — 99214 OFFICE O/P EST MOD 30 MIN: CPT | Performed by: FAMILY MEDICINE

## 2021-12-14 PROCEDURE — 36415 COLL VENOUS BLD VENIPUNCTURE: CPT

## 2021-12-14 RX ORDER — DAPAGLIFLOZIN AND METFORMIN HYDROCHLORIDE 10; 1000 MG/1; MG/1
1 TABLET, FILM COATED, EXTENDED RELEASE ORAL DAILY
Qty: 90 TABLET | Refills: 1 | Status: SHIPPED | OUTPATIENT
Start: 2021-12-14 | End: 2022-02-02

## 2021-12-14 RX ORDER — LISINOPRIL AND HYDROCHLOROTHIAZIDE 20; 12.5 MG/1; MG/1
1 TABLET ORAL DAILY
Qty: 90 TABLET | Refills: 1 | Status: SHIPPED | OUTPATIENT
Start: 2021-12-14

## 2021-12-14 RX ORDER — DULOXETIN HYDROCHLORIDE 30 MG/1
30 CAPSULE, DELAYED RELEASE ORAL DAILY
Qty: 90 CAPSULE | Refills: 1 | Status: SHIPPED | OUTPATIENT
Start: 2021-12-14

## 2021-12-14 RX ORDER — DULOXETIN HYDROCHLORIDE 30 MG/1
30 CAPSULE, DELAYED RELEASE ORAL DAILY
COMMUNITY
End: 2021-12-14

## 2021-12-14 RX ORDER — ATORVASTATIN CALCIUM 20 MG/1
20 TABLET, FILM COATED ORAL DAILY
Qty: 90 TABLET | Refills: 1 | Status: SHIPPED | OUTPATIENT
Start: 2021-12-14

## 2021-12-14 NOTE — PROGRESS NOTES
Patient presents with: Follow - Up: DM  Joint Pain  Derm Problem: c/o itching of back concerned w/ adverse effect of DM medication    HPI:   Bg Osorio is a 77year old female who presents to clinic for diabetes follow-up.   Patient has been compliant wi hypertension  -Blood pressure well controlled today  - lisinopril-hydroCHLOROthiazide 20-12.5 MG Oral Tab; Take 1 tablet by mouth daily. Dispense: 90 tablet; Refill: 1    4.  Total body pain  -Patient has taken ropinirole in the past without good relief bu

## 2021-12-15 ENCOUNTER — HOSPITAL ENCOUNTER (OUTPATIENT)
Dept: RADIATION ONCOLOGY | Age: 66
Discharge: HOME | End: 2021-12-15
Attending: INTERNAL MEDICINE

## 2021-12-15 ENCOUNTER — HOSPITAL ENCOUNTER (OUTPATIENT)
Dept: RADIATION ONCOLOGY | Age: 66
Discharge: HOME | End: 2021-12-16
Attending: RADIOLOGY

## 2021-12-15 VITALS
DIASTOLIC BLOOD PRESSURE: 80 MMHG | OXYGEN SATURATION: 97 % | HEART RATE: 74 BPM | WEIGHT: 158.84 LBS | TEMPERATURE: 98.2 F | SYSTOLIC BLOOD PRESSURE: 141 MMHG | BODY MASS INDEX: 27.73 KG/M2 | RESPIRATION RATE: 18 BRPM

## 2021-12-15 RX ORDER — DAPAGLIFLOZIN AND METFORMIN HYDROCHLORIDE 10; 1000 MG/1; MG/1
1 TABLET, FILM COATED, EXTENDED RELEASE ORAL DAILY
COMMUNITY
Start: 2021-12-14

## 2021-12-15 ASSESSMENT — PATIENT HEALTH QUESTIONNAIRE - PHQ9
CLINICAL INTERPRETATION OF PHQ2 SCORE: NO FURTHER SCREENING NEEDED
SUM OF ALL RESPONSES TO PHQ9 QUESTIONS 1 AND 2: 0
2. FEELING DOWN, DEPRESSED OR HOPELESS: NOT AT ALL
1. LITTLE INTEREST OR PLEASURE IN DOING THINGS: NOT AT ALL
SUM OF ALL RESPONSES TO PHQ9 QUESTIONS 1 AND 2: 0

## 2021-12-15 ASSESSMENT — PAIN SCALES - GENERAL: PAINLEVEL: 0

## 2021-12-20 DIAGNOSIS — J30.2 SEASONAL ALLERGIES: ICD-10-CM

## 2021-12-21 RX ORDER — LEVOCETIRIZINE DIHYDROCHLORIDE 5 MG/1
TABLET, FILM COATED ORAL
Qty: 30 TABLET | Refills: 0 | Status: SHIPPED | OUTPATIENT
Start: 2021-12-21

## 2022-01-09 ENCOUNTER — APPOINTMENT (OUTPATIENT)
Dept: GENERAL RADIOLOGY | Age: 67
End: 2022-01-09
Attending: EMERGENCY MEDICINE
Payer: MEDICARE

## 2022-01-09 ENCOUNTER — HOSPITAL ENCOUNTER (OUTPATIENT)
Age: 67
Discharge: HOME | End: 2022-01-09
Attending: EMERGENCY MEDICINE
Payer: MEDICARE

## 2022-01-09 VITALS
SYSTOLIC BLOOD PRESSURE: 134 MMHG | OXYGEN SATURATION: 100 % | RESPIRATION RATE: 18 BRPM | DIASTOLIC BLOOD PRESSURE: 68 MMHG | HEART RATE: 62 BPM | TEMPERATURE: 98 F

## 2022-01-09 DIAGNOSIS — S82.61XA CLOSED FRACTURE OF PROXIMAL LATERAL MALLEOLUS OF RIGHT FIBULA, INITIAL ENCOUNTER: Primary | ICD-10-CM

## 2022-01-09 PROCEDURE — 99203 OFFICE O/P NEW LOW 30 MIN: CPT

## 2022-01-09 PROCEDURE — 99214 OFFICE O/P EST MOD 30 MIN: CPT

## 2022-01-09 PROCEDURE — 29515 APPLICATION SHORT LEG SPLINT: CPT

## 2022-01-09 PROCEDURE — 73610 X-RAY EXAM OF ANKLE: CPT | Performed by: EMERGENCY MEDICINE

## 2022-01-09 NOTE — ED PROVIDER NOTES
Patient Seen in: [facility] [place]      History   Patient presents with:  Fall  Ankle Injury    Stated Complaint: [place] Offer on ice and injured right ankle    Subjective:   HPI    Patient is a 26-year-old female with past history of breast cancer, diabete of 5 and symmetric in the upper and lower extremities bilaterally  Extremities: Mild focal swelling and tenderness overlying the right lateral malleolus. There is no tenderness of the right foot. There is no proximal fibular tenderness.   The right Achill

## 2022-01-09 NOTE — ED INITIAL ASSESSMENT (HPI)
Slipped on ice last night injuring right ankle. Pain and swelling to lateral right ankle. + distal CMS. Painful weight bearing.

## 2022-01-12 ENCOUNTER — HOSPITAL ENCOUNTER (OUTPATIENT)
Dept: GENERAL RADIOLOGY | Age: 67
Discharge: HOME | End: 2022-01-12
Attending: ORTHOPAEDIC SURGERY
Payer: MEDICARE

## 2022-01-12 ENCOUNTER — OFFICE VISIT (OUTPATIENT)
Dept: ORTHOPEDICS CLINIC | Facility: CLINIC | Age: 67
End: 2022-01-12
Payer: MEDICARE

## 2022-01-12 DIAGNOSIS — S82.61XA CLOSED LOW LATERAL MALLEOLUS FRACTURE, RIGHT, INITIAL ENCOUNTER: Primary | ICD-10-CM

## 2022-01-12 DIAGNOSIS — S82.61XA CLOSED LOW LATERAL MALLEOLUS FRACTURE, RIGHT, INITIAL ENCOUNTER: ICD-10-CM

## 2022-01-12 PROCEDURE — 73600 X-RAY EXAM OF ANKLE: CPT | Performed by: ORTHOPAEDIC SURGERY

## 2022-01-12 PROCEDURE — 99204 OFFICE O/P NEW MOD 45 MIN: CPT | Performed by: ORTHOPAEDIC SURGERY

## 2022-01-12 NOTE — PROGRESS NOTES
Patient presents with:  Consult: Right ankle fracture, patient slipped on ice on 1/8/2022. Patient went to  on 1/9/22, patient had xrays taken. Patient is in a splint in the office today, patient rates pain 8/10.     HPI  Pt is a 78 yo F presenting after

## 2022-01-19 ENCOUNTER — HOSPITAL ENCOUNTER (OUTPATIENT)
Dept: GENERAL RADIOLOGY | Age: 67
Discharge: HOME | End: 2022-01-19
Attending: ORTHOPAEDIC SURGERY
Payer: MEDICARE

## 2022-01-19 DIAGNOSIS — S82.61XA CLOSED LOW LATERAL MALLEOLUS FRACTURE, RIGHT, INITIAL ENCOUNTER: ICD-10-CM

## 2022-01-19 PROCEDURE — 73610 X-RAY EXAM OF ANKLE: CPT | Performed by: ORTHOPAEDIC SURGERY

## 2022-01-26 ENCOUNTER — OFFICE VISIT (OUTPATIENT)
Dept: ORTHOPEDICS CLINIC | Facility: CLINIC | Age: 67
End: 2022-01-26
Payer: MEDICARE

## 2022-01-26 DIAGNOSIS — S82.64XD CLOSED NONDISPLACED FRACTURE OF LATERAL MALLEOLUS OF RIGHT FIBULA WITH ROUTINE HEALING, SUBSEQUENT ENCOUNTER: Primary | ICD-10-CM

## 2022-01-26 PROCEDURE — 99214 OFFICE O/P EST MOD 30 MIN: CPT | Performed by: ORTHOPAEDIC SURGERY

## 2022-01-26 NOTE — PROGRESS NOTES
Patient presents with:  Fracture: Right - slipped on ice 1/8/22- 6/10 pain in office today -Xrays done 1/19/22    HPI  Patient states the pain is improving. She is able to put half her weight on it in the boot. She is icing and elevating her leg.  She repea

## 2022-02-01 ENCOUNTER — TELEPHONE (OUTPATIENT)
Dept: PHYSICAL THERAPY | Facility: HOSPITAL | Age: 67
End: 2022-02-01

## 2022-02-01 ENCOUNTER — TELEPHONE (OUTPATIENT)
Dept: FAMILY MEDICINE CLINIC | Facility: CLINIC | Age: 67
End: 2022-02-01

## 2022-02-01 RX ORDER — DAPAGLIFLOZIN AND METFORMIN HYDROCHLORIDE 10; 1000 MG/1; MG/1
1 TABLET, FILM COATED, EXTENDED RELEASE ORAL DAILY
Qty: 90 TABLET | Refills: 1 | Status: CANCELLED | OUTPATIENT
Start: 2022-02-01

## 2022-02-02 RX ORDER — DAPAGLIFLOZIN AND METFORMIN HYDROCHLORIDE 10; 1000 MG/1; MG/1
1 TABLET, FILM COATED, EXTENDED RELEASE ORAL DAILY
Qty: 90 TABLET | Refills: 1 | Status: SHIPPED | OUTPATIENT
Start: 2022-02-02 | End: 2022-02-07

## 2022-02-02 NOTE — TELEPHONE ENCOUNTER
Refill passed per [address] protocol. Requested Prescriptions   Pending Prescriptions Disp Refills    Dapagliflozin-metFORMIN HCl ER (XIGDUO XR)  MG Oral Tablet 24 Hr 90 tablet 1     Sig: Take 1 tablet by mouth daily.         Diabetes Medication Protocol Passed - 2/2/2022  1:07 PM        Passed - Last A1C < 7.5 and within past 6 months     Lab Results   Component Value Date    A1C 6.8 (H) 12/14/2021               Passed - Appointment in past 6 or next 3 months        Passed - GFR Non- > 50     Lab Results   Component Value Date    GFRNAA 68 12/14/2021                 Passed - GFR in the past 12 months               Recent Outpatient Visits              1 week ago Closed nondisplaced fracture of lateral malleolus of right fibula with routine healing, subsequent encounter    [address] [facility], [address] [place] [address],     Office Visit    3 weeks ago Closed low lateral malleolus fracture, right, initial encounter    [address] [place] [address],     Office Visit    1 month ago Type 2 diabetes mellitus with hyperglycemia, without long-term current use of insulin [facility])    [address], Jarad Levin MD    Office Visit    3 months ago Type 2 diabetes mellitus with hyperglycemia, without long-term current use of insulin [facility])    [address], Jarad Levin MD    Office Visit            Future Appointments         Provider Department Appt Notes    In 6 days Karla Olivier, PT [facility] in [address] [place] [address]    In 1 week Juno Shepard, [address], [place], Orthopedics 2 week FU/foot fracture   *policy informed    In 1 week [facility] [employer], AMAURY Verma in [address] [place] [address]    In 1 week Karla Olivier, [address]    In 2 weeks [facility] [employer], [address] [facility] in [address] [place] [address]    In 2 weeks Karla Olivier, PT [employer] in [address] [place] [address]    In 3 weeks Karla Jonas PT [employer] in [address] [place] [address]    In 3 weeks Karla Jonas PT [employer] in [address] [place] [address]    In 4 weeks Karla Jonas, [address] in [address] [place] [address]

## 2022-02-02 NOTE — TELEPHONE ENCOUNTER
Spoke with patient's daughter Jessica Cole on ROBERTH via Media tab. She was not with her mom. I advised her of the note below and she will have her mom contact us regarding which pharmacy her mom would want the medication to be sent to.

## 2022-02-07 NOTE — TELEPHONE ENCOUNTER
Metformin 1000 mg twice daily sent to pharmacy and statin. Would have patient take this and follow-up in 3 months for annual wellness visit. Unclear when her last physical was since she is a newer patient and was being seen at [facility] before.

## 2022-02-07 NOTE — TELEPHONE ENCOUNTER
Patient states she went to  medication but its $700 after insurance and she can't afford to pay this much. Would like to know if Dr. Anai Henderson can substitute this medication. Please contact patient to inform if medication was changed.

## 2022-02-08 ENCOUNTER — OFFICE VISIT (OUTPATIENT)
Dept: PHYSICAL THERAPY | Age: 67
End: 2022-02-08
Attending: ORTHOPAEDIC SURGERY
Payer: MEDICARE

## 2022-02-08 DIAGNOSIS — S82.64XD CLOSED NONDISPLACED FRACTURE OF LATERAL MALLEOLUS OF RIGHT FIBULA WITH ROUTINE HEALING, SUBSEQUENT ENCOUNTER: ICD-10-CM

## 2022-02-08 PROCEDURE — 97110 THERAPEUTIC EXERCISES: CPT

## 2022-02-08 PROCEDURE — 97161 PT EVAL LOW COMPLEX 20 MIN: CPT

## 2022-02-08 NOTE — PATIENT INSTRUCTIONS
Home Exercise Program [[id number]]    Ankle AROM - Inversion & Eversion -  Repetir (Repeat) 20 Veces ( Times), Completar (Complete) 2 Conjuntos (Sets), Realizar (Perform) 4, Veces (Times) a Week    ANKLE PUMPS - AP -  Repetir (Repeat) 20 Veces ( Times), Sostener (Hold) 1 Segundos) (Second(s)), Completar (Complete) 2 Conjuntos (Sets), Realizar (Perform) 1, Veces (Times) a Day    Supine Active Hamstring Stretch -  Repetir (Repeat) 10 Veces ( Times), Completar (Complete) 2 Conjuntos (Sets), Realizar (Perform) 1, Veces (Times) a Day    Plantarflexion -  Repetir (Repeat) 10 Veces ( Times), Completar (Complete) 2 Conjuntos (Sets), Realizar (Perform) 4, Veces (Times) a Week    Seated Towel Scrunches with foot.  -  Repetir (Repeat) 30 Veces ( Times), Completar (Complete) 2 Conjuntos (Sets), Realizar (Perform) 1, Veces (Times) a Day    ARCH LIFTS -  Repetir (Repeat) 20 Veces ( Times), Sostener (Hold) 1 Segundos) (Second(s)), Completar (Complete) 2 Conjuntos (Sets), Realizar (Perform) 1, Veces (Times) a Day

## 2022-02-08 NOTE — TELEPHONE ENCOUNTER
With , called the cell (no VM Set up), called the home # and left detailed message (ok per ROBERTH via media tab) advised to call back as needed.

## 2022-02-09 ENCOUNTER — HOSPITAL ENCOUNTER (OUTPATIENT)
Dept: GENERAL RADIOLOGY | Age: 67
Discharge: HOME | End: 2022-02-09
Attending: ORTHOPAEDIC SURGERY
Payer: MEDICARE

## 2022-02-09 ENCOUNTER — OFFICE VISIT (OUTPATIENT)
Dept: ORTHOPEDICS CLINIC | Facility: CLINIC | Age: 67
End: 2022-02-09
Payer: MEDICARE

## 2022-02-09 DIAGNOSIS — S82.64XD CLOSED NONDISPLACED FRACTURE OF LATERAL MALLEOLUS OF RIGHT FIBULA WITH ROUTINE HEALING, SUBSEQUENT ENCOUNTER: Primary | ICD-10-CM

## 2022-02-09 DIAGNOSIS — S82.64XD CLOSED NONDISPLACED FRACTURE OF LATERAL MALLEOLUS OF RIGHT FIBULA WITH ROUTINE HEALING, SUBSEQUENT ENCOUNTER: ICD-10-CM

## 2022-02-09 PROCEDURE — 73610 X-RAY EXAM OF ANKLE: CPT | Performed by: ORTHOPAEDIC SURGERY

## 2022-02-09 PROCEDURE — 99214 OFFICE O/P EST MOD 30 MIN: CPT | Performed by: ORTHOPAEDIC SURGERY

## 2022-02-09 NOTE — PROGRESS NOTES
Patient presents with:  Fracture: RIght ankle, patient completed xrays. Patient states pain is 6/10, there is swelling, no numbness or tingling. Patient states that her pain is bad at night. Patient started Physical Therapy yesterday. Patient states that she is wearing cast boot when weight bearing. HPI  Patient states her pain is improving. She has been wrapping it and walking with a four pronged cane. Overall her pain is improving. She is eager to drive again. Physical Exam  Gen: NAD, alert, answering questions appropriately  HEENT: NCAT, EOMI  Lungs: NL breathing, symmetrical lung expansion  Abd: Soft, ND  Extremities:   R Ankle with neutral alignment, DF/PF/EHL intact, SILT, cap refill brisk  Improving ankle ROM. Improved swelling, distal fibular nontender. Imaging: Three views of the right ankle were examined and reviewed by me demonstrating interval healing of the distal fibula. There is stable ankle alignment with no mortise widening. Assessment/Plan: 77year old year old female presenting 1 month status post a right distal fibula fracture. She can continue weightbearing as tolerated and transition to a regular shoe with a lace up ankle brace. She should continue physical therapy, as it is helping her. Home exercises were shown to her today. She may take Tylenol 500 mg 2 tabs PO q 8 hours as needed for pain. When she is in a regular shoe, she may begin driving again. We will have her follow-up in 4 weeks for repeat x-rays of her ankle.       Alex Mooney,   02/09/22

## 2022-02-10 NOTE — TELEPHONE ENCOUNTER
Called Pt, spoke with daughter, Krista Orourke, who Pt gave verbal ok for me to speak with. Informed Krista Orourke of Dr. Maurice Justice message she verbalized understanding.

## 2022-02-11 ENCOUNTER — OFFICE VISIT (OUTPATIENT)
Dept: PHYSICAL THERAPY | Age: 67
End: 2022-02-11
Attending: ORTHOPAEDIC SURGERY
Payer: MEDICARE

## 2022-02-11 PROCEDURE — 97112 NEUROMUSCULAR REEDUCATION: CPT

## 2022-02-11 PROCEDURE — 97110 THERAPEUTIC EXERCISES: CPT

## 2022-02-11 NOTE — PROGRESS NOTES
Dx: Closed nondisplaced fracture of lateral malleolus of right fibula with routine healing, subsequent encounter (S82.64XD                     Insurance (Authorized # of Visits):  Medicare, 15 visits until 5/9/2022           Authorizing Physician: Dr. Rayo Abrams MD visit: none scheduled  Fall Risk: standard         Precautions: n/a             Subjective: Patient repots that she has discontinued using the walking boot and the cane. She is s now wearing shoes and also the lace up ankle brace at this time. She states that she is having a little bit of pain in the lateral ankle still. Pain: 5/10      Objective: See treatment log        Assessment: Focused on ROM and strengthening as needed to improve her gait mechanics and her endurance with weight bearing activities. She does have moderate stiffness in the ankle into all planes; however, she did tolerate some light strengthening which will help to improve her foot and ankle stability for gait. Goals:   Goals: (to be met in 15 visits)  1. Patient will be independent in a progressive HEP to help manage symptoms and achieve functional independence in 3 sessions. 2. Patient will decrease his max pain to 3/10 as needed to improve his functional independence. 3. Patient will increase her DF strength to 10 deg as needed to return to walking in the community with a shoe and no AD. 4. Patient will increase her PF strength to 4/5 or greater as needed to walk with a normal gait pattern. 5. Patient will be able to ambulate stair ascent and descent step over step without increased pain as needed to enter and exit her home  6. Patient will demonstrate SLB greater than 20 sec on the right as needed to safely ambulate in the community with variable terrain. 7. Patient will be independent in sleep positioning modification to decrease waking at night by 50%.       Plan: Continued with progressive strengthening and flexibility as needed to improve her LE mechanics; progress with shuttle and continues with PF strengthening for stairs and gait. Date: 2/11/2022  TX#: 2/15 Date:                 TX#: 3/ Date:                 TX#: 4/ Date:                 TX#: 5/ Date:    Tx#: 6/   Man        Ther ex NuStep x 5 min, level 3  PROM ankle PF/DF, inver/eversion  Ankle alphabet x 1  AROM DF/PF x 20  AROM Inv/ev x 20  PRE DF/PF x 15, GTB  PRE inversion/eversion x 15 YTB  BB M/L taps x 2 min  BB A/P taps x 2 min   Marches on Airex x 20   Step up fwd 4 inch  X 15  Step up lat 4 inch x 15  Standing heel raises x 10  Standing toe raises x 10   Lateral walks 2 laps   Monster walks 2 laps       NMR BAPS in sitting, level 2,  CW and CCW x 15 each  Towel scrunches x 2 min  Seated arch lifts x 20   BB center balance x 2 min       Ther act           HEP: Home Exercise Program [[id number]]  LATERAL MONSTER WALK - ELASTIC BAND AT [address] (Repeat) 10 Veces ( Times), Sostener (Hold) 1 Segundos) (Second(s)), Completar (Complete) 2 Conjuntos (Sets), Realizar (Perform) 1, Veces (Times) a Day  MONSTER WALK - ELASTIC BAND AT ANKLES -  Repetir (Repeat) 10 Veces ( Times), Sostener (Hold) 1 Segundos) (Second(s)), Completar (Complete) 2 Conjuntos (Sets), Realizar (Perform) 1, Veces(Times) a Day  HEEL AND TOE RAISES - STANDING -  Repetir (Repeat) 10 Veces ( Times), Sostener (Hold) 1 Segundos) (Second(s)), Completar (Complete) 1 Conjunto (Set), Realizar (Perform) 2, Veces (Times) a Day    Charges: 2 ther ex, 1 NMR    Total Timed Treatment: 42 min  Total Treatment Time: 42 min

## 2022-02-15 ENCOUNTER — OFFICE VISIT (OUTPATIENT)
Dept: PHYSICAL THERAPY | Age: 67
End: 2022-02-15
Attending: ORTHOPAEDIC SURGERY
Payer: MEDICARE

## 2022-02-15 PROCEDURE — 97110 THERAPEUTIC EXERCISES: CPT

## 2022-02-15 PROCEDURE — 97112 NEUROMUSCULAR REEDUCATION: CPT

## 2022-02-15 NOTE — PROGRESS NOTES
Dx: Closed nondisplaced fracture of lateral malleolus of right fibula with routine healing, subsequent encounter (S82.64XD                     Insurance (Authorized # of Visits):  Medicare, 15 visits until 5/9/2022           Authorizing Physician: Dr. Adelfo Guy  Next MD visit: none scheduled  Fall Risk: standard         Precautions: n/a             Subjective: Patient repots that she was sore in the feet and tired after her last session. Today she is only al little bit sore in the foot at the lateral, medial and anterior ankle. She states that she hs the most soreness with pointing her toe. She did try driving and that went fine    Pain: 5/10      Objective: See treatment log        Assessment: Patient is still minimally tender at the distal fibula at this time and stiff in the TCJ likely due to prior bracing to allow for fracture healing. Added some PF stretching to help improve improved TCJ mobility as that will help to decrease lateral ankle stresses. Need to continue with strengthening  and flexibility as needed to decrease ankle pain during gait and stairs. Goals:   Goals: (to be met in 15 visits)  1. Patient will be independent in a progressive HEP to help manage symptoms and achieve functional independence in 3 sessions. 2. Patient will decrease his max pain to 3/10 as needed to improve his functional independence. 3. Patient will increase her DF strength to 10 deg as needed to return to walking in the community with a shoe and no AD. 4. Patient will increase her PF strength to 4/5 or greater as needed to walk with a normal gait pattern. 5. Patient will be able to ambulate stair ascent and descent step over step without increased pain as needed to enter and exit her home  6. Patient will demonstrate SLB greater than 20 sec on the right as needed to safely ambulate in the community with variable terrain.   7. Patient will be independent in sleep positioning modification to decrease waking at night by 50%.      Plan: Continued with progressive strengthening and flexibility to improve her sagittal plane mobility progress with shuttle next visit. Date: 2/11/2022  TX#: 2/15 Date:   2/15/2022              TX#: 3/15 Date:                 TX#: 4/ Date:                 TX#: 5/ Date:    Tx#: 6/   Man        Ther ex NuStep x 5 min, level 3  PROM ankle PF/DF, inver/eversion  Ankle alphabet x 1  AROM DF/PF x 20  AROM Inv/ev x 20  PRE DF/PF x 15, GTB  PRE inversion/eversion x 15 YTB  BB M/L taps x 2 min  BB A/P taps x 2 min   Marches on Airex x 20   Step up fwd 4 inch  X 15  Step up lat 4 inch x 15  Standing heel raises x 10  Standing toe raises x 10   Lateral walks 2 laps   Monster walks 2 laps NuStep x 5 min, level 3  PROM ankle PF/DF, inver/eversion  Ankle alphabet x 1  AROM DF/PF x 20  AROM Inv/ev x 20  PRE DF/PF x 15, GTB  PRE inversion/eversion x 15 YTB  BB M/L taps x 2 min  BB A/P taps x 2 min     Step up fwd 6 inch  X 15  Step up lat 6 inch x 15  Standing heel raises x 10  Standing toe raises x 10   Lateral walks 2 laps  YTB  Monster walks 2 laps YTB  Seated PF x 20  Seated DF x 20  Slant stretch x 1 min       NMR BAPS in sitting, level 2,  CW and CCW x 15 each  Towel scrunches x 2 min  Seated arch lifts x 20   BB center balance x 2 min BAPS in sitting, level 2,  A/P, M/L x 15 each  Towel scrunches x 2 min  Seated arch lifts x 20  ND  SLB 2 x 30\" R   Marches on Airex x 20       Ther act           HEP: add resistance with lateral walks and monster walks    Charges: 2 ther ex, 1 NMR    Total Timed Treatment: 40 min  Total Treatment Time: 40 min

## 2022-02-16 ENCOUNTER — TELEPHONE (OUTPATIENT)
Dept: FAMILY MEDICINE CLINIC | Facility: CLINIC | Age: 67
End: 2022-02-16

## 2022-02-16 NOTE — TELEPHONE ENCOUNTER
With DOROTHEA Purdy  Giovany ID# [id number]. Identified patient's name and . Patient states she used to take Metformin 500 mg once a day. States Dapagliflozin-Metformin HCL ER  mg (1 tab q day) was ordered, but didn't  due to cost.    Patient confused about new script for Metformin HCL that was sent on  - 1000 mg, 1 tab twice a day. States she thinks the dosage will be too high.

## 2022-02-17 ENCOUNTER — OFFICE VISIT (OUTPATIENT)
Dept: PHYSICAL THERAPY | Age: 67
End: 2022-02-17
Attending: ORTHOPAEDIC SURGERY
Payer: MEDICARE

## 2022-02-17 PROCEDURE — 97110 THERAPEUTIC EXERCISES: CPT

## 2022-02-17 PROCEDURE — 97112 NEUROMUSCULAR REEDUCATION: CPT

## 2022-02-17 NOTE — TELEPHONE ENCOUNTER
Will resend Metformin 500 mg daily prescription to pharmacy. Please help her schedule Medicare annual wellness visit in upcoming months. Thank you.

## 2022-02-17 NOTE — PROGRESS NOTES
Dx: Closed nondisplaced fracture of lateral malleolus of right fibula with routine healing, subsequent encounter (S82.64XD                     Insurance (Authorized # of Visits):  Medicare, 15 visits until 5/9/2022           Authorizing Physician: Dr. Dani Abrams MD visit: none scheduled  Fall Risk: standard         Precautions: n/a             Subjective: Patient repots that she is still having some pain located at the lateral and ankle. It feels very stiff in the ankle moving into DF. She has been tired n the legs and ankle following therapy but not a lot of pain    Pain: 5/10      Objective: See treatment log      Assessment: Patient does continue to have some stiffness in the sagittal plane; progressed on the shuttle to help promote some overpressure into DF. Continued with tandem walking on the Airex beam for proprioceptive progression. Need to continue with progressive strengthening and flexibility to improve her gait mechanics and her dynamic foot and ankle control for stairs and gait. Goals:   Goals: (to be met in 15 visits)  1. Patient will be independent in a progressive HEP to help manage symptoms and achieve functional independence in 3 sessions. 2. Patient will decrease his max pain to 3/10 as needed to improve his functional independence. 3. Patient will increase her DF strength to 10 deg as needed to return to walking in the community with a shoe and no AD. 4. Patient will increase her PF strength to 4/5 or greater as needed to walk with a normal gait pattern. 5. Patient will be able to ambulate stair ascent and descent step over step without increased pain as needed to enter and exit her home  6. Patient will demonstrate SLB greater than 20 sec on the right as needed to safely ambulate in the community with variable terrain. 7. Patient will be independent in sleep positioning modification to decrease waking at night by 50%.     Plan: Continued with progressive strengthening and balance training for better mechanics; trial lateral step downs as tolerated    Date: 2/11/2022  TX#: 2/15 Date:   2/15/2022              TX#: 3/15 Date:  2/17/2022               TX#: 4/15 Date:                 TX#: 5/ Date:    Tx#: 6/   Man        Ther ex NuStep x 5 min, level 3  PROM ankle PF/DF, inver/eversion  Ankle alphabet x 1  AROM DF/PF x 20  AROM Inv/ev x 20  PRE DF/PF x 15, GTB  PRE inversion/eversion x 15 YTB  BB M/L taps x 2 min  BB A/P taps x 2 min   Marches on Airex x 20   Step up fwd 4 inch  X 15  Step up lat 4 inch x 15  Standing heel raises x 10  Standing toe raises x 10   Lateral walks 2 laps   Monster walks 2 laps NuStep x 5 min, level 4  PROM ankle PF/DF, inver/eversion  Ankle alphabet x 1  AROM DF/PF x 20  AROM Inv/ev x 20  PRE DF/PF x 15, GTB  PRE inversion/eversion x 15 YTB  BB M/L taps x 2 min  BB A/P taps x 2 min     Step up fwd 6 inch  X 15  Step up lat 6 inch x 15  Standing heel raises x 10  Standing toe raises x 10   Lateral walks 2 laps  YTB  Monster walks 2 laps YTB  Seated PF x 20  Seated DF x 20  Slant stretch x 1 min  NuStep x 5 min, level 4  PROM ankle PF/DF, inver/eversion  Ankle alphabet x 1  AROM DF/PF x 20  AROM Inv/ev x 20  PRE DF/PF x 15, BTB  PRE inversion/eversion x 15 RTB  BB M/L taps x 2 min  BB A/P taps x 2 min   Step up fwd 6 inch  X 15  Step up lat 6 inch x 15  Standing heel raises x 15  Standing toe raises x 15   Lateral walks 2 laps  YTB  Monster walks 2 laps YTB  Slant stretch x 1 min     NMR BAPS in sitting, level 2,  CW and CCW x 15 each  Towel scrunches x 2 min  Seated arch lifts x 20   BB center balance x 2 min BAPS in sitting, level 2,  A/P, M/L x 15 each  Towel scrunches x 2 min  Seated arch lifts x 20  ND  SLB 2 x 30\" R   Marches on Airex x 20  BAPS in sitting, level 2,  A/P, M/L x 15 each  Towel scrunches x 2 min  Seated arch lifts x 20  ND  SLB 3 x 30\" R  airex  Marches on Airex x 20      Ther act           HEP: not progressed this session    Charges: 2 ther ex, 1 NMR    Total Timed Treatment: 42 min  Total Treatment Time: 42 min

## 2022-02-17 NOTE — TELEPHONE ENCOUNTER
Left message to call back, transfer to triage (home #) Her daughter's cell # on ROBERTH has no vm set up.

## 2022-02-18 NOTE — TELEPHONE ENCOUNTER
Spoke with Sami at the [address] [place] [address] to inquire as to if the patient has been notified the Metformin is ready and did she pick it up. Per Ike President it's ready and they notify if the patient doesn't pick it up within 3 days. Attempted to call her daughter Yonis Mcnair but VM box isn't set up. After 4 attempts.  No response letter sent in [address] [place] [address]

## 2022-02-21 ENCOUNTER — TELEPHONE (OUTPATIENT)
Dept: FAMILY MEDICINE CLINIC | Facility: CLINIC | Age: 67
End: 2022-02-21

## 2022-02-21 ENCOUNTER — OFFICE VISIT (OUTPATIENT)
Dept: FAMILY MEDICINE CLINIC | Facility: CLINIC | Age: 67
End: 2022-02-21
Payer: MEDICARE

## 2022-02-21 VITALS
WEIGHT: 162 LBS | HEART RATE: 74 BPM | SYSTOLIC BLOOD PRESSURE: 118 MMHG | TEMPERATURE: 99 F | BODY MASS INDEX: 28.7 KG/M2 | HEIGHT: 63 IN | DIASTOLIC BLOOD PRESSURE: 76 MMHG

## 2022-02-21 DIAGNOSIS — S82.831D OTHER CLOSED FRACTURE OF PROXIMAL END OF RIGHT FIBULA WITH ROUTINE HEALING, SUBSEQUENT ENCOUNTER: Primary | ICD-10-CM

## 2022-02-21 PROCEDURE — 99213 OFFICE O/P EST LOW 20 MIN: CPT | Performed by: FAMILY MEDICINE

## 2022-02-21 NOTE — TELEPHONE ENCOUNTER
Language line #[phone] assisted with the phone call. Pt broke her ankle 01/08. States that she has had lots of pain and was only given Tylenol. Would like to f/u with Dr. Stanford Charles. appt given for today.

## 2022-02-22 ENCOUNTER — OFFICE VISIT (OUTPATIENT)
Dept: PHYSICAL THERAPY | Age: 67
End: 2022-02-22
Attending: ORTHOPAEDIC SURGERY
Payer: MEDICARE

## 2022-02-22 PROCEDURE — 97112 NEUROMUSCULAR REEDUCATION: CPT

## 2022-02-22 PROCEDURE — 97110 THERAPEUTIC EXERCISES: CPT

## 2022-02-22 NOTE — PROGRESS NOTES
Dx: Closed nondisplaced fracture of lateral malleolus of right fibula with routine healing, subsequent encounter (S82.64XD                     Insurance (Authorized # of Visits):  Medicare, 15 visits until 5/9/2022           Authorizing Physician: Dr. Yanci Perez  Next MD visit: none scheduled  Fall Risk: standard         Precautions: n/a             Subjective: Patient repots that she is still having some pain in her ankle with walking but overall she thinks that her ankle and foot are getting better. She is having less stiffness the ankle than she was. Pain: 5/10      Objective: See treatment log      Assessment:   Patient did ave better mobility into DF this session which will help to improve her mechanics with gait. Also progressed with curtsy squats and lateral step downs to improve her [facility] range of motion into DF which will help to improve her gait pattern and mechanics n stairs and with transfers; needs reinforcement      Goals:   Goals: (to be met in 15 visits)  1. Patient will be independent in a progressive HEP to help manage symptoms and achieve functional independence in 3 sessions. 2. Patient will decrease his max pain to 3/10 as needed to improve his functional independence. 3. Patient will increase her DF strength to 10 deg as needed to return to walking in the community with a shoe and no AD. 4. Patient will increase her PF strength to 4/5 or greater as needed to walk with a normal gait pattern. 5. Patient will be able to ambulate stair ascent and descent step over step without increased pain as needed to enter and exit her home  6. Patient will demonstrate SLB greater than 20 sec on the right as needed to safely ambulate in the community with variable terrain. 7. Patient will be independent in sleep positioning modification to decrease waking at night by 50%. Plan: Continued with [facility] range of motion and balance training to improve gait; add SLB reaches and also Foam roll balance as tolerated. Date: 2/11/2022  TX#: 2/15 Date:   2/15/2022              TX#: 3/15 Date:  2/17/2022               TX#: 4/15 Date:   2/22/2022              TX#: 5/15 Date:    Tx#: 6/   Man        Ther ex NuStep x 5 min, level 3  PROM ankle PF/DF, inver/eversion  Ankle alphabet x 1  AROM DF/PF x 20  AROM Inv/ev x 20  PRE DF/PF x 15, GTB  PRE inversion/eversion x 15 YTB  BB M/L taps x 2 min  BB A/P taps x 2 min   Marches on Airex x 20   Step up fwd 4 inch  X 15  Step up lat 4 inch x 15  Standing heel raises x 10  Standing toe raises x 10   Lateral walks 2 laps   Monster walks 2 laps NuStep x 5 min, level 4  PROM ankle PF/DF, inver/eversion  Ankle alphabet x 1  AROM DF/PF x 20  AROM Inv/ev x 20  PRE DF/PF x 15, GTB  PRE inversion/eversion x 15 YTB  BB M/L taps x 2 min  BB A/P taps x 2 min     Step up fwd 6 inch  X 15  Step up lat 6 inch x 15  Standing heel raises x 10  Standing toe raises x 10   Lateral walks 2 laps  YTB  Monster walks 2 laps YTB  Seated PF x 20  Seated DF x 20  Slant stretch x 1 min  NuStep x 5 min, level 4  PROM ankle PF/DF, inver/eversion  Ankle alphabet x 1  AROM DF/PF x 20  AROM Inv/ev x 20  PRE DF/PF x 15, BTB  PRE inversion/eversion x 15 RTB  BB M/L taps x 2 min  BB A/P taps x 2 min   Step up fwd 6 inch  X 15  Step up lat 6 inch x 15  Standing heel raises x 15  Standing toe raises x 15   Lateral walks 2 laps  YTB  Monster walks 2 laps YTB  Slant stretch x 1 min NuStep x 5 min, level 4  PROM ankle PF/DF, inver/eversion x 4 min  AROM DF/PF x 20  AROM Inv/ev x 20  PRE DF/PF x 15, BTB  PRE inversion/eversion x 15 RTB  BB M/L taps x 2 min  BB A/P taps x 2 min   Step up fwd 6 inch  X 20   Step up lat 6 inch x 20   Standing heel raises x 20  Standing toe raises x 20   Lateral walks 2 laps  RTB  Monster walks 2 laps RTB  Slant stretch x 1 min  Toe walk x 1 laps  Lateral step down x 10, 4 inch box    NMR BAPS in sitting, level 2,  CW and CCW x 15 each  Towel scrunches x 2 min  Seated arch lifts x 20   BB center balance x 2 min BAPS in sitting, level 2,  A/P, M/L x 15 each  Towel scrunches x 2 min  Seated arch lifts x 20  ND  SLB 2 x 30\" R   Marches on Airex x 20  BAPS in sitting, level 2,  A/P, M/L x 15 each  Towel scrunches x 2 min  Seated arch lifts x 20  ND  SLB 3 x 30\" R  Airex  Marches on Airex x 20  BAPS in sitting, level 2,  A/P, M/L x 15 each  Towel scrunches x 2 min  SLB 3 x 30\" R  Airex  Marches on Airex x 20  Curtsy squat x 15 R only   SLB reach NV  SL balance on 1/2 foam NV    Ther act           HEP: Home Exercise Program [FUNYKEU]  Curtsy Squat -  Repetir (Repeat) 15 Veces ( Times),  HEEL WALK -  Repetir (Repeat) 1 Hora (Time), Sostener (Hold) 1 Segundos) (Second(s)), Completar (Complete) 1 Conjunto (Set), Realizar (Perform) 1, Veces (Times) a Day  TOE WALK -  Repetir (Repeat) 1 Hora (Time), Sostener (Hold) 1 Segundos) (Second(s)), Completar (Complete) 1 Conjunto (Set), Realizar (Perform) 1, Veces (Times) a Day    Charges: 2 ther ex, 1 NMR    Total Timed Treatment: 42 min  Total Treatment Time: 42 min

## 2022-02-24 ENCOUNTER — OFFICE VISIT (OUTPATIENT)
Dept: PHYSICAL THERAPY | Age: 67
End: 2022-02-24
Attending: ORTHOPAEDIC SURGERY
Payer: MEDICARE

## 2022-02-24 PROCEDURE — 97112 NEUROMUSCULAR REEDUCATION: CPT

## 2022-02-24 PROCEDURE — 97110 THERAPEUTIC EXERCISES: CPT

## 2022-02-24 NOTE — PROGRESS NOTES
Dx: Closed nondisplaced fracture of lateral malleolus of right fibula with routine healing, subsequent encounter (S82.64XD                     Insurance (Authorized # of Visits):  Medicare, 15 visits until 5/9/2022           Authorizing Physician: Dr. Yomi Abrams MD visit: none scheduled  Fall Risk: standard         Precautions: n/a             Subjective: Patient repots that she is feeling ok with her pain in the ankle; she does feel that her ankle is getting better. She feels more stable in the foot, but when she has pain it is at the lateral ankle and across the front of the ankle. Pain: 3/10      Objective: See treatment log    AROM 2/24/22:   DF 11 deg  PF 40 deg  Inver 42 deg  Eversion 12 deg      Assessment:   Patient does have good range of motion in the foot and ankle; She does have increased stance time noted during gait likely due to improved range of motion during the gait cycle. Continued with balance exercises and progressed BAPS to weightbearing to improve her dynamic foot and ankle control in CKC; needs reinforcement. Goals:   Goals: (to be met in 15 visits)  1. Patient will be independent in a progressive HEP to help manage symptoms and achieve functional independence in 3 sessions. 2. Patient will decrease his max pain to 3/10 as needed to improve his functional independence. 3. Patient will increase her DF strength to 10 deg as needed to return to walking in the community with a shoe and no AD. 4. Patient will increase her PF strength to 4/5 or greater as needed to walk with a normal gait pattern. 5. Patient will be able to ambulate stair ascent and descent step over step without increased pain as needed to enter and exit her home  6. Patient will demonstrate SLB greater than 20 sec on the right as needed to safely ambulate in the community with variable terrain. 7. Patient will be independent in sleep positioning modification to decrease waking at night by 50%.     Plan: Continued with [facility] range of motion and balance; progress with Bosu step ups next visit   Date:   2/15/2022              TX#: 3/15 Date:  2/17/2022               TX#: 4/15 Date:   2/22/2022              TX#: 5/15 Date: 2/24/2022  Tx#: 6/15    Man       Ther ex NuStep x 5 min, level 4  PROM ankle PF/DF, inver/eversion  Ankle alphabet x 1  AROM DF/PF x 20  AROM Inv/ev x 20  PRE DF/PF x 15, GTB  PRE inversion/eversion x 15 YTB  BB M/L taps x 2 min  BB A/P taps x 2 min     Step up fwd 6 inch  X 15  Step up lat 6 inch x 15  Standing heel raises x 10  Standing toe raises x 10   Lateral walks 2 laps  YTB  Monster walks 2 laps YTB  Seated PF x 20  Seated DF x 20  Slant stretch x 1 min  NuStep x 5 min, level 4  PROM ankle PF/DF, inver/eversion  Ankle alphabet x 1  AROM DF/PF x 20  AROM Inv/ev x 20  PRE DF/PF x 15, BTB  PRE inversion/eversion x 15 RTB  BB M/L taps x 2 min  BB A/P taps x 2 min   Step up fwd 6 inch  X 15  Step up lat 6 inch x 15  Standing heel raises x 15  Standing toe raises x 15   Lateral walks 2 laps  YTB  Monster walks 2 laps YTB  Slant stretch x 1 min NuStep x 5 min, level 4  PROM ankle PF/DF, inver/eversion x 4 min  AROM DF/PF x 20  AROM Inv/ev x 20  PRE DF/PF x 15, BTB  PRE inversion/eversion x 15 RTB  BB M/L taps x 2 min  BB A/P taps x 2 min   Step up fwd 6 inch  X 20   Step up lat 6 inch x 20   Standing heel raises x 20  Standing toe raises x 20   Lateral walks 2 laps  RTB  Monster walks 2 laps RTB  Slant stretch x 1 min  Toe walk x 1 laps  Lateral step down x 10, 4 inch box NuStep x 5 min, level 4  PROM ankle PF/DF, inver/eversion x 4 min  AROM DF/PF x 20  AROM Inv/ev x 20  PRE DF/PF x 15, BTB  PRE inversion/eversion x 15 RTB  BB M/L taps x 1 min  BB A/P taps x 1 min   Step up fwd 6 inch  X 20   Step up lat 6 inch x 20   Standing heel raises x 20  Standing toe raises x 20   Lateral walks 2 laps  RTB  Monster walks 2 laps RTB  Slant stretch x 1 min  Toe walk x 1 laps  Lateral step down x 10, 4 inch box   NMR BAPS in sitting, level 2,  A/P, M/L x 15 each  Towel scrunches x 2 min  Seated arch lifts x 20  ND  SLB 2 x 30\" R   Marches on Airex x 20  BAPS in sitting, level 2,  A/P, M/L x 15 each  Towel scrunches x 2 min  Seated arch lifts x 20  ND  SLB 3 x 30\" R  Airex  Marches on Airex x 20  BAPS in sitting, level 2,  A/P, M/L x 15 each  Towel scrunches x 2 min  SLB 3 x 30\" R  Airex  Marches on Airex x 20  Curtsy squat x 15 R only   SLB reach NV  SL balance on 1/2 foam NV BAPS in standing, level 2,  A/P, M/L x 15 each  Towel scrunches x 2 min  SLB 3 x 30\" R  Airex  Marches on Airex x 20  Curtsy squat x 15 R only   SLB reach with foot tap on the cones  X 8 on Airex  BOSU step ups NV   Ther act          HEP: not progressed this visit    Charges: 2 ther ex, 1 NMR    Total Timed Treatment: 40 min  Total Treatment Time: 40 min

## 2022-03-01 ENCOUNTER — OFFICE VISIT (OUTPATIENT)
Dept: PHYSICAL THERAPY | Age: 67
End: 2022-03-01
Attending: ORTHOPAEDIC SURGERY
Payer: MEDICARE

## 2022-03-01 PROCEDURE — 97110 THERAPEUTIC EXERCISES: CPT

## 2022-03-01 PROCEDURE — 97112 NEUROMUSCULAR REEDUCATION: CPT

## 2022-03-01 NOTE — PROGRESS NOTES
Dx: Closed nondisplaced fracture of lateral malleolus of right fibula with routine healing, subsequent encounter (S82.64XD                     Insurance (Authorized # of Visits):  Medicare, 15 visits until 5/9/2022           Authorizing Physician: Dr. Oneil Abrams MD visit: none scheduled  Fall Risk: standard         Precautions: n/a             Subjective: Patient repots that her foot and ankle are feeling better but she is still having pain in her foot when she is standing for a while; this is the most painful time with her ankle. She states that with standing her pain is a 5/10 but it is better with walking. She does feels that she is getting better and she is able to walk without limping and walk longer than before. Pain: 2/10      Objective: See treatment log    AROM 2/24/22:   DF 11 deg  PF 40 deg  Inver 42 deg  Eversion 12 deg    Assessment:    Patient had no increased pain with ther ex this session. She has improved SL balance with less LOB and improved postural corrections with balance challenges. She is less stiff in the sagittal plane which will improve her gait mechanics and decreased frontal plane compensations. She is progressing well which will improve her safety and performance of all ADLS. Goals:   Goals: (to be met in 15 visits)  1. Patient will be independent in a progressive HEP to help manage symptoms and achieve functional independence in 3 sessions. 2. Patient will decrease his max pain to 3/10 as needed to improve his functional independence. 3. Patient will increase her DF strength to 10 deg as needed to return to walking in the community with a shoe and no AD. 4. Patient will increase her PF strength to 4/5 or greater as needed to walk with a normal gait pattern. 5. Patient will be able to ambulate stair ascent and descent step over step without increased pain as needed to enter and exit her home  6.  Patient will demonstrate SLB greater than 20 sec on the right as needed to safely ambulate in the community with variable terrain. 7. Patient will be independent in sleep positioning modification to decrease waking at night by 50%. Plan: Continued with CKC range, balance and strength for progression to DC next visit. Perform a progress note next visit.      Date:   2/15/2022              TX#: 3/15 Date:  2/17/2022               TX#: 4/15 Date:   2/22/2022              TX#: 5/15 Date: 2/24/2022  Tx#: 6/15  Date: 3/1/2022  Tx#: 7/15    Man        Ther ex NuStep x 5 min, level 4  PROM ankle PF/DF, inver/eversion  Ankle alphabet x 1  AROM DF/PF x 20  AROM Inv/ev x 20  PRE DF/PF x 15, GTB  PRE inversion/eversion x 15 YTB  BB M/L taps x 2 min  BB A/P taps x 2 min     Step up fwd 6 inch  X 15  Step up lat 6 inch x 15  Standing heel raises x 10  Standing toe raises x 10   Lateral walks 2 laps  YTB  Monster walks 2 laps YTB  Seated PF x 20  Seated DF x 20  Slant stretch x 1 min  NuStep x 5 min, level 4  PROM ankle PF/DF, inver/eversion  Ankle alphabet x 1  AROM DF/PF x 20  AROM Inv/ev x 20  PRE DF/PF x 15, BTB  PRE inversion/eversion x 15 RTB  BB M/L taps x 2 min  BB A/P taps x 2 min   Step up fwd 6 inch  X 15  Step up lat 6 inch x 15  Standing heel raises x 15  Standing toe raises x 15   Lateral walks 2 laps  YTB  Monster walks 2 laps YTB  Slant stretch x 1 min NuStep x 5 min, level 4  PROM ankle PF/DF, inver/eversion x 4 min  AROM DF/PF x 20  AROM Inv/ev x 20  PRE DF/PF x 15, BTB  PRE inversion/eversion x 15 RTB  BB M/L taps x 2 min  BB A/P taps x 2 min   Step up fwd 6 inch  X 20   Step up lat 6 inch x 20   Standing heel raises x 20  Standing toe raises x 20   Lateral walks 2 laps  RTB  Monster walks 2 laps RTB  Slant stretch x 1 min  Toe walk x 1 laps  Lateral step down x 10, 4 inch box NuStep x 5 min, level 4  PROM ankle PF/DF, inver/eversion x 4 min  AROM DF/PF x 20  AROM Inv/ev x 20  PRE DF/PF x 15, BTB  PRE inversion/eversion x 15 RTB  BB M/L taps x 1 min  BB A/P taps x 1 min   Step up fwd 6 inch  X 20   Step up lat 6 inch x 20   Standing heel raises x 20  Standing toe raises x 20   Lateral walks 2 laps  RTB  Monster walks 2 laps RTB  Slant stretch x 1 min  Toe walk x 1 laps  Lateral step down x 10, 4 inch box NuStep x 5 min, level 5  PROM ankle PF/DF, inver/eversion x 4 min  AROM DF/PF x 20  AROM Inv/ev x 20  PRE DF/PF x 15, BlackTB  PRE inversion/eversion x 15 GTB  BB M/L taps x 1 min  BB A/P taps x 1 min  Step up fwd 6 inch  X 20   Step up lat 6 inch x 20   Standing heel raises x 20  Standing toe raises x 20   Lateral walks 2 laps  RTB  Monster walks 2 laps RTB  Slant stretch x 1 min  Toe walk x 1 laps  Heel walks 1 laps  Lateral step down x 15 , 4 inch box    NMR BAPS in sitting, level 2,  A/P, M/L x 15 each  Towel scrunches x 2 min  Seated arch lifts x 20  ND  SLB 2 x 30\" R   Marches on Airex x 20  BAPS in sitting, level 2,  A/P, M/L x 15 each  Towel scrunches x 2 min  Seated arch lifts x 20  ND  SLB 3 x 30\" R  Airex  Marches on Airex x 20  BAPS in sitting, level 2,  A/P, M/L x 15 each  Towel scrunches x 2 min  SLB 3 x 30\" R  Airex  Marches on Airex x 20  Curtsy squat x 15 R only   SLB reach NV  SL balance on 1/2 foam NV BAPS in standing, level 2,  A/P, M/L x 15 each  Towel scrunches x 2 min  SLB 3 x 30\" R  Airex  Marches on Airex x 20  Curtsy squat x 15 R only   SLB reach with foot tap on the cones  X 8 on Airex  BOSU step ups NV BAPS in standing, level 2,  A/P, M/L x 15 each  Towel scrunches x 2 min ND  SLB 3 x 30\" R  Airex  Marches on Airex x 20  Curtsy squat x 15 R only   SLB reach with foot tap on the cones  X 8 on Airex ND  BOSU step ups fwd x 15   Ther act           HEP: not progressed this visit    Charges: 2 ther ex, 1 NMR    Total Timed Treatment: 39 min  Total Treatment Time: 39 min

## 2022-03-03 ENCOUNTER — OFFICE VISIT (OUTPATIENT)
Dept: PHYSICAL THERAPY | Age: 67
End: 2022-03-03
Attending: ORTHOPAEDIC SURGERY
Payer: MEDICARE

## 2022-03-03 PROCEDURE — 97110 THERAPEUTIC EXERCISES: CPT

## 2022-03-03 PROCEDURE — 97112 NEUROMUSCULAR REEDUCATION: CPT

## 2022-03-03 NOTE — PROGRESS NOTES
Carlos Manuel  Pt has attended 8 visits in Physical Therapy. Dx: Closed nondisplaced fracture of lateral malleolus of right fibula with routine healing, subsequent encounter (S82.64XD                    Insurance (Authorized # of Visits):  Medicare, 15 visits until 5/9/2022           Authorizing Physician: Dr. Diggs Reasons  Next MD visit: none scheduled  Fall Risk: standard         Precautions: n/a             Subjective: Patient repots improvement in her walking and her pain since beginning physical therapy. She will still have some minimal pain at the anterior ankle and this is a soreness and stiffness that her foot and ankle. She is able to walk further ad she is limping a lot less than she was in the past.  She is no longer using an AD, is full weight bearing, returned to driving and is no longer having pain at night without any increased pain with sleep positioning. Pt describes pain level  at best 0/10, at worst 2  /10. Current functional limitations include intermittently with walking and stairs  Pain: 2/10      Objective:   Observation: 2 shoes. Patient stands with minimally decreased symmetrical LE weight bearing,  bilateral femoral medial rotation and anterior pelvic tilt  Palpation: No tenderness to palpation   Sensation: No deficits to light touch in the bilateral LE.     AROM: (* denotes performed with pain)  Foot/Ankle   DF: R 12 (3*); L 10  PF: R 41(32*); L 43  INV: R 40(30*); L 38  EV: R 16 (10); L 10       Accessory motion: all other LE AROM WFL bilaterally    Flexibility:  Hamstrings: R -15 (-30); L -20  Gastroc-soleus: R min short and stiff; L min short and stiff     Strength/MMT: (* denotes performed with pain)  Hip Knee Foot/Ankle   Flexion: R 4+/5; L 5/5  Extension: R 4+/5; L 4+/5  Abduction: R 4+/5; L 4+/5   Flexion: R 4+/5; L 5/5  Extension: R 4/5; L 4+/5    DF: R 4+/5; L 5/5  PF: R 4+/5; L 5/5  INV: R 4+/5; L 5/5  EV: R 4+/5; L 5/5     Special tests:   None performed    Gait: pt ambulates on level ground with min decreased push off on the right   Balance: SLS: R 3 sec unstable, L  5 sec unstable     Assessment:  Patient has made gains in her range of motion and strength in the foot and ankle as needed to transition to full weight bearing safely. She has good strength to control her foot in the frontal plane as her sagittal plane mobility has improved. She has improve proprioception to control ankle stresses for improved safety and gait mechanics. She is independent in a progressive HEP and is appropriate to DC as she has no significant functional deficits at this time and will continue to improve with her HEP. Goals:   Goals: (to be met in 15 visits)  1. Patient will be independent in a progressive HEP to help manage symptoms and achieve functional independence in 3 sessions. MET  2. Patient will decrease his max pain to 3/10 as needed to improve his functional independence. MET  3. Patient will increase her DF strength to 10 deg as needed to return to walking in the community with a shoe and no AD. MET  4. Patient will increase her PF strength to 4/5 or greater as needed to walk with a normal gait pattern. MET  5. Patient will be able to ambulate stair ascent and descent step over step without increased pain as needed to enter and exit her home MET  6. Patient will demonstrate SLB greater than 20 sec on the right as needed to safely ambulate in the community with variable terrain. WORKING TOWARD  7. Patient will be independent in sleep positioning modification to decrease waking at night by 50%. MET         Plan: DC with HEP. Patient was instructed to follow up with their physician should an exacerbation of symptoms arise. Patient/Family/Caregiver was advised of these findings, precautions, and treatment options and has agreed to actively participate in planning and for this course of care.     Thank you for your referral. If you have any questions, please contact me at Dept: [phone]. Sincerely,  Electronically signed by therapist: Daria Sever, PT     Physician's certification required:  Yes  Please co-sign or sign and return this letter via fax as soon as possible to [phone]. I certify the need for these services furnished under this plan of treatment and while under my care.     X___________________________________________________ Date____________________    Certification From: 9/6/3137  To:6/1/2022      Date:   2/22/2022              TX#: 5/15 Date: 2/24/2022  Tx#: 6/15  Date: 3/1/2022  Tx#: 7/15  Date: 3/3/2022  Tx#: 8/15    Man       Ther ex NuStep x 5 min, level 4  PROM ankle PF/DF, inver/eversion x 4 min  AROM DF/PF x 20  AROM Inv/ev x 20  PRE DF/PF x 15, BTB  PRE inversion/eversion x 15 RTB  BB M/L taps x 2 min  BB A/P taps x 2 min   Step up fwd 6 inch  X 20   Step up lat 6 inch x 20   Standing heel raises x 20  Standing toe raises x 20   Lateral walks 2 laps  RTB  Monster walks 2 laps RTB  Slant stretch x 1 min  Toe walk x 1 laps  Lateral step down x 10, 4 inch box NuStep x 5 min, level 4  PROM ankle PF/DF, inver/eversion x 4 min  AROM DF/PF x 20  AROM Inv/ev x 20  PRE DF/PF x 15, BTB  PRE inversion/eversion x 15 RTB  BB M/L taps x 1 min  BB A/P taps x 1 min   Step up fwd 6 inch  X 20   Step up lat 6 inch x 20   Standing heel raises x 20  Standing toe raises x 20   Lateral walks 2 laps  RTB  Monster walks 2 laps RTB  Slant stretch x 1 min  Toe walk x 1 laps  Lateral step down x 10, 4 inch box NuStep x 5 min, level 5  PROM ankle PF/DF, inver/eversion x 4 min  AROM DF/PF x 20  AROM Inv/ev x 20  PRE DF/PF x 15, BlackTB  PRE inversion/eversion x 15 GTB  BB M/L taps x 1 min  BB A/P taps x 1 min  Step up fwd 6 inch  X 20   Step up lat 6 inch x 20   Standing heel raises x 20  Standing toe raises x 20   Lateral walks 2 laps  RTB  Monster walks 2 laps RTB  Slant stretch x 1 min  Toe walk x 1 laps  Heel walks 1 laps  Lateral step down x 15 , 4 inch box  Nuep x 5 min, level 5  PROM ankle PF/DF, inver/eversion x 4 min  AROM DF/PF x 20  AROM Inv/ev x 20  PRE DF/PF x 15, BlackTB  PRE inversion/eversion x 15 GTB  BB M/L taps x 1 min  BB A/P taps x 1 min  Step up fwd 6 inch  X 20   Step up lat 6 inch x 20   Standing heel raises x 20  Standing toe raises x 20   Lateral walks 2 laps  RTB  Monster walks 2 laps RTB  Slant stretch x 1 min  Toe walk x 1 laps  Heel walks 1 laps  Lateral step down x 15 , 4 inch box    NMR BAPS in sitting, level 2,  A/P, M/L x 15 each  Towel scrunches x 2 min  SLB 3 x 30\" R  Airex  Marches on Airex x 20  Curtsy squat x 15 R only   SLB reach NV  SL balance on 1/2 foam NV BAPS in standing, level 2,  A/P, M/L x 15 each  Towel scrunches x 2 min  SLB 3 x 30\" R  Airex  Marches on Airex x 20  Curtsy squat x 15 R only   SLB reach with foot tap on the cones  X 8 on Airex  BOSU step ups NV BAPS in standing, level 2,  A/P, M/L x 15 each  Towel scrunches x 2 min ND  SLB 3 x 30\" R  Airex  Marches on Airex x 20  Curtsy squat x 15 R only   SLB reach with foot tap on the cones  X 8 on Airex ND  BOSU step ups fwd x 15 BAPS in standing, level 2,  A/P, M/L x 15 each  Towel scrunches x 2 min ND  SLB 3 x 30\" R  Airex  Marches on Airex x 20  Curtsy squat x 15 R only   SLB reach with foot tap on the cones  X 10 on Airex   BOSU step ups fwd x 15   Ther act          HEP: REVIEWED     Charges: 2 ther ex, 1 NMR    Total Timed Treatment: 40 min  Total Treatment Time: 40 min

## 2022-03-09 ENCOUNTER — HOSPITAL ENCOUNTER (OUTPATIENT)
Dept: GENERAL RADIOLOGY | Age: 67
Discharge: HOME | End: 2022-03-09
Attending: ORTHOPAEDIC SURGERY
Payer: MEDICARE

## 2022-03-09 ENCOUNTER — OFFICE VISIT (OUTPATIENT)
Dept: ORTHOPEDICS CLINIC | Facility: CLINIC | Age: 67
End: 2022-03-09
Payer: MEDICARE

## 2022-03-09 DIAGNOSIS — S82.64XD CLOSED NONDISPLACED FRACTURE OF LATERAL MALLEOLUS OF RIGHT FIBULA WITH ROUTINE HEALING, SUBSEQUENT ENCOUNTER: Primary | ICD-10-CM

## 2022-03-09 DIAGNOSIS — S82.64XD CLOSED NONDISPLACED FRACTURE OF LATERAL MALLEOLUS OF RIGHT FIBULA WITH ROUTINE HEALING, SUBSEQUENT ENCOUNTER: ICD-10-CM

## 2022-03-09 PROCEDURE — 99214 OFFICE O/P EST MOD 30 MIN: CPT | Performed by: ORTHOPAEDIC SURGERY

## 2022-03-09 PROCEDURE — 73610 X-RAY EXAM OF ANKLE: CPT | Performed by: ORTHOPAEDIC SURGERY

## 2022-03-15 ENCOUNTER — OFFICE VISIT (OUTPATIENT)
Dept: FAMILY MEDICINE CLINIC | Facility: CLINIC | Age: 67
End: 2022-03-15
Payer: MEDICARE

## 2022-03-15 ENCOUNTER — LAB ENCOUNTER (OUTPATIENT)
Dept: LAB | Age: 67
End: 2022-03-15
Attending: FAMILY MEDICINE
Payer: MEDICARE

## 2022-03-15 VITALS
HEIGHT: 63 IN | WEIGHT: 162 LBS | BODY MASS INDEX: 28.7 KG/M2 | HEART RATE: 58 BPM | DIASTOLIC BLOOD PRESSURE: 80 MMHG | SYSTOLIC BLOOD PRESSURE: 152 MMHG

## 2022-03-15 DIAGNOSIS — E11.65 TYPE 2 DIABETES MELLITUS WITH HYPERGLYCEMIA, WITHOUT LONG-TERM CURRENT USE OF INSULIN (HCC): ICD-10-CM

## 2022-03-15 DIAGNOSIS — Z12.31 ENCOUNTER FOR SCREENING MAMMOGRAM FOR MALIGNANT NEOPLASM OF BREAST: ICD-10-CM

## 2022-03-15 DIAGNOSIS — Z12.11 COLON CANCER SCREENING: ICD-10-CM

## 2022-03-15 DIAGNOSIS — Z78.0 POST-MENOPAUSAL: ICD-10-CM

## 2022-03-15 DIAGNOSIS — R52 TOTAL BODY PAIN: ICD-10-CM

## 2022-03-15 DIAGNOSIS — I10 ESSENTIAL HYPERTENSION: ICD-10-CM

## 2022-03-15 DIAGNOSIS — S82.831D OTHER CLOSED FRACTURE OF PROXIMAL END OF RIGHT FIBULA WITH ROUTINE HEALING, SUBSEQUENT ENCOUNTER: ICD-10-CM

## 2022-03-15 DIAGNOSIS — Z00.00 ENCOUNTER FOR ANNUAL HEALTH EXAMINATION: Primary | ICD-10-CM

## 2022-03-15 DIAGNOSIS — E78.2 MIXED HYPERLIPIDEMIA: ICD-10-CM

## 2022-03-15 DIAGNOSIS — Z00.00 ENCOUNTER FOR ANNUAL HEALTH EXAMINATION: ICD-10-CM

## 2022-03-15 DIAGNOSIS — E11.42 DIABETIC POLYNEUROPATHY ASSOCIATED WITH TYPE 2 DIABETES MELLITUS (HCC): ICD-10-CM

## 2022-03-15 DIAGNOSIS — N39.41 URGE INCONTINENCE: ICD-10-CM

## 2022-03-15 DIAGNOSIS — Z23 NEED FOR SHINGLES VACCINE: ICD-10-CM

## 2022-03-15 LAB
ALBUMIN SERPL-MCNC: 3.8 G/DL (ref 3.4–5)
ALBUMIN/GLOB SERPL: 1.2 {RATIO} (ref 1–2)
ALP LIVER SERPL-CCNC: 105 U/L
ALT SERPL-CCNC: 26 U/L
ANION GAP SERPL CALC-SCNC: 5 MMOL/L (ref 0–18)
AST SERPL-CCNC: 20 U/L (ref 15–37)
BASOPHILS # BLD AUTO: 0.04 X10(3) UL (ref 0–0.2)
BASOPHILS NFR BLD AUTO: 0.8 %
BILIRUB SERPL-MCNC: 0.6 MG/DL (ref 0.1–2)
BUN BLD-MCNC: 13 MG/DL (ref 7–18)
BUN/CREAT SERPL: 17.6 (ref 10–20)
CALCIUM BLD-MCNC: 9.2 MG/DL (ref 8.5–10.1)
CHLORIDE SERPL-SCNC: 107 MMOL/L (ref 98–112)
CHOLEST SERPL-MCNC: 148 MG/DL (ref ?–200)
CO2 SERPL-SCNC: 27 MMOL/L (ref 21–32)
CREAT BLD-MCNC: 0.74 MG/DL
DEPRECATED RDW RBC AUTO: 46.2 FL (ref 35.1–46.3)
EOSINOPHIL # BLD AUTO: 0.05 X10(3) UL (ref 0–0.7)
EOSINOPHIL NFR BLD AUTO: 1 %
ERYTHROCYTE [DISTWIDTH] IN BLOOD BY AUTOMATED COUNT: 13.5 % (ref 11–15)
EST. AVERAGE GLUCOSE BLD GHB EST-MCNC: 166 MG/DL (ref 68–126)
FASTING PATIENT LIPID ANSWER: YES
FASTING STATUS PATIENT QL REPORTED: YES
GLOBULIN PLAS-MCNC: 3.3 G/DL (ref 2.8–4.4)
GLUCOSE BLD-MCNC: 143 MG/DL (ref 70–99)
HBA1C MFR BLD: 7.4 % (ref ?–5.7)
HCT VFR BLD AUTO: 40.5 %
HDLC SERPL-MCNC: 52 MG/DL (ref 40–59)
HGB BLD-MCNC: 12.8 G/DL
IMM GRANULOCYTES # BLD AUTO: 0.02 X10(3) UL (ref 0–1)
IMM GRANULOCYTES NFR BLD: 0.4 %
LYMPHOCYTES # BLD AUTO: 1.64 X10(3) UL (ref 1–4)
LYMPHOCYTES NFR BLD AUTO: 31.8 %
MCH RBC QN AUTO: 29.3 PG (ref 26–34)
MCHC RBC AUTO-ENTMCNC: 31.6 G/DL (ref 31–37)
MCV RBC AUTO: 92.7 FL
MONOCYTES # BLD AUTO: 0.43 X10(3) UL (ref 0.1–1)
MONOCYTES NFR BLD AUTO: 8.3 %
NEUTROPHILS # BLD AUTO: 2.97 X10 (3) UL (ref 1.5–7.7)
NEUTROPHILS # BLD AUTO: 2.97 X10(3) UL (ref 1.5–7.7)
NEUTROPHILS NFR BLD AUTO: 57.7 %
NONHDLC SERPL-MCNC: 96 MG/DL (ref ?–130)
OSMOLALITY SERPL CALC.SUM OF ELEC: 291 MOSM/KG (ref 275–295)
PLATELET # BLD AUTO: 259 10(3)UL (ref 150–450)
POTASSIUM SERPL-SCNC: 4.4 MMOL/L (ref 3.5–5.1)
PROT SERPL-MCNC: 7.1 G/DL (ref 6.4–8.2)
RBC # BLD AUTO: 4.37 X10(6)UL
SODIUM SERPL-SCNC: 139 MMOL/L (ref 136–145)
TRIGL SERPL-MCNC: 155 MG/DL (ref 30–149)
TSI SER-ACNC: 2.95 MIU/ML (ref 0.36–3.74)
VLDLC SERPL CALC-MCNC: 24 MG/DL (ref 0–30)
WBC # BLD AUTO: 5.2 X10(3) UL (ref 4–11)

## 2022-03-15 PROCEDURE — 80053 COMPREHEN METABOLIC PANEL: CPT

## 2022-03-15 PROCEDURE — 80061 LIPID PANEL: CPT

## 2022-03-15 PROCEDURE — 36415 COLL VENOUS BLD VENIPUNCTURE: CPT

## 2022-03-15 PROCEDURE — 85025 COMPLETE CBC W/AUTO DIFF WBC: CPT

## 2022-03-15 PROCEDURE — 84443 ASSAY THYROID STIM HORMONE: CPT

## 2022-03-15 PROCEDURE — G0439 PPPS, SUBSEQ VISIT: HCPCS | Performed by: FAMILY MEDICINE

## 2022-03-15 PROCEDURE — 83036 HEMOGLOBIN GLYCOSYLATED A1C: CPT

## 2022-03-15 RX ORDER — ZOSTER VACCINE RECOMBINANT, ADJUVANTED 50 MCG/0.5
0.5 KIT INTRAMUSCULAR ONCE
Qty: 1 EACH | Refills: 0 | Status: SHIPPED | OUTPATIENT
Start: 2022-03-15 | End: 2022-03-15

## 2022-03-15 RX ORDER — PREDNISOLONE ACETATE 10 MG/ML
SUSPENSION/ DROPS OPHTHALMIC
COMMUNITY
Start: 2022-03-07

## 2022-03-15 RX ORDER — OXYBUTYNIN CHLORIDE 5 MG/1
5 TABLET ORAL DAILY PRN
Qty: 30 TABLET | Refills: 0 | Status: SHIPPED | OUTPATIENT
Start: 2022-03-15

## 2022-03-15 RX ORDER — DULOXETIN HYDROCHLORIDE 30 MG/1
30 CAPSULE, DELAYED RELEASE ORAL DAILY
Qty: 90 CAPSULE | Refills: 1 | Status: SHIPPED | OUTPATIENT
Start: 2022-03-15

## 2022-03-18 ENCOUNTER — OFFICE VISIT (OUTPATIENT)
Dept: HEMATOLOGY/ONCOLOGY | Age: 67
End: 2022-03-18
Attending: INTERNAL MEDICINE

## 2022-03-18 VITALS
RESPIRATION RATE: 16 BRPM | BODY MASS INDEX: 28.13 KG/M2 | TEMPERATURE: 97.9 F | HEART RATE: 58 BPM | WEIGHT: 161.16 LBS | OXYGEN SATURATION: 97 % | SYSTOLIC BLOOD PRESSURE: 117 MMHG | DIASTOLIC BLOOD PRESSURE: 78 MMHG

## 2022-03-18 DIAGNOSIS — Z12.31 ENCOUNTER FOR SCREENING MAMMOGRAM FOR MALIGNANT NEOPLASM OF BREAST: ICD-10-CM

## 2022-03-18 DIAGNOSIS — Z12.39 SCREENING BREAST EXAMINATION: Primary | ICD-10-CM

## 2022-03-18 PROCEDURE — 99213 OFFICE O/P EST LOW 20 MIN: CPT | Performed by: INTERNAL MEDICINE

## 2022-03-18 PROCEDURE — 99213 OFFICE O/P EST LOW 20 MIN: CPT

## 2022-03-18 PROCEDURE — 99211 OFF/OP EST MAY X REQ PHY/QHP: CPT

## 2022-03-18 RX ORDER — DULOXETIN HYDROCHLORIDE 30 MG/1
30 CAPSULE, DELAYED RELEASE ORAL DAILY
COMMUNITY

## 2022-03-18 ASSESSMENT — PAIN SCALES - GENERAL: PAINLEVEL: 7

## 2022-03-18 ASSESSMENT — PATIENT HEALTH QUESTIONNAIRE - PHQ9
2. FEELING DOWN, DEPRESSED OR HOPELESS: NOT AT ALL
1. LITTLE INTEREST OR PLEASURE IN DOING THINGS: NOT AT ALL
SUM OF ALL RESPONSES TO PHQ9 QUESTIONS 1 AND 2: 0
CLINICAL INTERPRETATION OF PHQ2 SCORE: NO FURTHER SCREENING NEEDED
SUM OF ALL RESPONSES TO PHQ9 QUESTIONS 1 AND 2: 0

## 2022-03-30 NOTE — TELEPHONE ENCOUNTER
Refill passed per [facility] protocol. Routing to Dr. Jacques Carey for review due to type of medication. Requested Prescriptions   Pending Prescriptions Disp Refills    DICLOFENAC 50 MG Oral Tab EC [Pharmacy Med Name: DICLOFENAC SOD EC 50 MG TAB] 45 tablet 0     Sig: Take 1 tablet (50 mg total) by mouth 2 (two) times daily as needed (WITH FOOD/ TOME CON COMIDA).         Non-Narcotic Pain Medication Protocol Passed - 3/30/2022  2:41 AM        Passed - Appointment in past 6 or next 3 months             Recent Outpatient Visits              2 weeks ago Encounter for annual health examination    [address] Teodora Perry MD    Office Visit    3 weeks ago Closed nondisplaced fracture of lateral malleolus of right fibula with routine healing, subsequent encounter    [facility]    Office Visit    3 weeks ago     [facility] in [place], [place]    Office Visit    4 weeks ago     [facility] in [place], [place]    Office Visit    1 month ago     [facility] in [facility], [address] [place] [address] Visit           Future Appointments         Provider Department Appt Notes    In 2 weeks Juan Pablo Yost, [address] [place], [place] [address], Orthopedics 3 week FU

## 2022-04-06 NOTE — TELEPHONE ENCOUNTER
Please review; protocol failed/No Protcol    Requested Prescriptions   Pending Prescriptions Disp Refills    OXYBUTYNIN 5 MG Oral Tab [Pharmacy Med Name: OXYBUTYNIN 5 MG TABLET] 30 tablet 0     Sig: TAKE 1 TABLET BY MOUTH DAILY AS NEEDED.         Genitourinary Medications Passed - 4/6/2022  9:33 AM        Passed - Patient does not have pulmonary hypertension on problem list        Passed - Appointment in the past 12 or next 3 months              Recent Outpatient Visits              3 weeks ago [facility] for annual health examination    [facility] [address], Michelle Hall MD    Office Visit    4 weeks ago Closed nondisplaced fracture of lateral malleolus of right fibula with routine healing, subsequent encounter    [facility], [facility] [address]     Office Visit    1 month ago     [facility] in [place], [place]    Office Visit    1 month ago     [facility] in [place] [address]    1 month ago     [facility] in [address] [place] Visit            Future Appointments         Provider Department Appt Notes    In 1 week DO Pb Marie, Orthopedics 3 week FU

## 2022-04-11 RX ORDER — OXYBUTYNIN CHLORIDE 5 MG/1
TABLET ORAL
Qty: 30 TABLET | Refills: 0 | Status: SHIPPED | OUTPATIENT
Start: 2022-04-11

## 2022-05-05 RX ORDER — OXYBUTYNIN CHLORIDE 5 MG/1
5 TABLET ORAL DAILY PRN
Qty: 90 TABLET | Refills: 1 | Status: SHIPPED | OUTPATIENT
Start: 2022-05-05

## 2022-05-05 NOTE — TELEPHONE ENCOUNTER
Please review. Protocol passed but was only sent for 30 tablets last 2 months. Ok to fill for 90 days supply?     Requested Prescriptions   Pending Prescriptions Disp Refills    OXYBUTYNIN 5 MG Oral Tab [Pharmacy Med Name: OXYBUTYNIN 5 MG TABLET] 30 tablet 0     Sig: TOME AFSANEH TABLETA TODOS LOS BROWN CUANDO SEA NECESARIO        Genitourinary Medications Passed - 5/5/2022  8:32 AM        Passed - Patient does not have pulmonary hypertension on problem list        Passed - Appointment in the past 12 or next 3 months              Recent Outpatient Visits              1 month ago En[facility] for annual health examination    [facility]    [facility]    1 month ago Closed nondisplaced fracture of lateral malleolus of right fibula with routine healing, subsequent encounter    [address], Orthopedics José Ba DO    Office Visit    2 months ago     [facility] in [place], [place]    Office Visit    2 months ago     [facility] in [facility]    2 months ago     [facility] in [place], [place]    Office Visit

## 2022-05-27 DIAGNOSIS — E11.65 TYPE 2 DIABETES MELLITUS WITH HYPERGLYCEMIA, WITHOUT LONG-TERM CURRENT USE OF INSULIN (HCC): ICD-10-CM

## 2022-08-09 ENCOUNTER — LAB ENCOUNTER (OUTPATIENT)
Dept: LAB | Age: 67
End: 2022-08-09
Attending: NURSE ANESTHETIST, CERTIFIED REGISTERED
Payer: MEDICARE

## 2022-08-09 ENCOUNTER — OFFICE VISIT (OUTPATIENT)
Dept: FAMILY MEDICINE CLINIC | Facility: CLINIC | Age: 67
End: 2022-08-09
Payer: MEDICARE

## 2022-08-09 VITALS
HEIGHT: 63 IN | SYSTOLIC BLOOD PRESSURE: 153 MMHG | HEART RATE: 69 BPM | TEMPERATURE: 98 F | WEIGHT: 161 LBS | DIASTOLIC BLOOD PRESSURE: 89 MMHG | BODY MASS INDEX: 28.53 KG/M2

## 2022-08-09 DIAGNOSIS — D17.9 MULTIPLE LIPOMAS: ICD-10-CM

## 2022-08-09 DIAGNOSIS — E11.65 TYPE 2 DIABETES MELLITUS WITH HYPERGLYCEMIA, WITHOUT LONG-TERM CURRENT USE OF INSULIN (HCC): ICD-10-CM

## 2022-08-09 DIAGNOSIS — E11.65 TYPE 2 DIABETES MELLITUS WITH HYPERGLYCEMIA, WITHOUT LONG-TERM CURRENT USE OF INSULIN (HCC): Primary | ICD-10-CM

## 2022-08-09 DIAGNOSIS — K59.00 CONSTIPATION, UNSPECIFIED CONSTIPATION TYPE: ICD-10-CM

## 2022-08-09 LAB
ALBUMIN SERPL-MCNC: 3.9 G/DL (ref 3.4–5)
ALBUMIN/GLOB SERPL: 1 {RATIO} (ref 1–2)
ALP LIVER SERPL-CCNC: 88 U/L
ALT SERPL-CCNC: 38 U/L
ANION GAP SERPL CALC-SCNC: 9 MMOL/L (ref 0–18)
AST SERPL-CCNC: 22 U/L (ref 15–37)
BILIRUB SERPL-MCNC: 0.5 MG/DL (ref 0.1–2)
BUN BLD-MCNC: 21 MG/DL (ref 7–18)
BUN/CREAT SERPL: 28.4 (ref 10–20)
CALCIUM BLD-MCNC: 9.8 MG/DL (ref 8.5–10.1)
CHLORIDE SERPL-SCNC: 107 MMOL/L (ref 98–112)
CHOLEST SERPL-MCNC: 214 MG/DL (ref ?–200)
CO2 SERPL-SCNC: 25 MMOL/L (ref 21–32)
CREAT BLD-MCNC: 0.74 MG/DL
CREAT UR-SCNC: 45.3 MG/DL
EST. AVERAGE GLUCOSE BLD GHB EST-MCNC: 189 MG/DL (ref 68–126)
FASTING PATIENT LIPID ANSWER: YES
FASTING STATUS PATIENT QL REPORTED: YES
GFR SERPLBLD BASED ON 1.73 SQ M-ARVRAT: 89 ML/MIN/1.73M2 (ref 60–?)
GLOBULIN PLAS-MCNC: 3.9 G/DL (ref 2.8–4.4)
GLUCOSE BLD-MCNC: 121 MG/DL (ref 70–99)
HBA1C MFR BLD: 8.2 % (ref ?–5.7)
HDLC SERPL-MCNC: 44 MG/DL (ref 40–59)
LDLC SERPL CALC-MCNC: 138 MG/DL (ref ?–100)
MICROALBUMIN UR-MCNC: <0.5 MG/DL
NONHDLC SERPL-MCNC: 170 MG/DL (ref ?–130)
OSMOLALITY SERPL CALC.SUM OF ELEC: 296 MOSM/KG (ref 275–295)
POTASSIUM SERPL-SCNC: 4.5 MMOL/L (ref 3.5–5.1)
PROT SERPL-MCNC: 7.8 G/DL (ref 6.4–8.2)
SODIUM SERPL-SCNC: 141 MMOL/L (ref 136–145)
TRIGL SERPL-MCNC: 179 MG/DL (ref 30–149)
VLDLC SERPL CALC-MCNC: 33 MG/DL (ref 0–30)

## 2022-08-09 PROCEDURE — 82570 ASSAY OF URINE CREATININE: CPT

## 2022-08-09 PROCEDURE — 80053 COMPREHEN METABOLIC PANEL: CPT

## 2022-08-09 PROCEDURE — 99214 OFFICE O/P EST MOD 30 MIN: CPT | Performed by: FAMILY MEDICINE

## 2022-08-09 PROCEDURE — 80061 LIPID PANEL: CPT

## 2022-08-09 PROCEDURE — 83036 HEMOGLOBIN GLYCOSYLATED A1C: CPT

## 2022-08-09 PROCEDURE — 36415 COLL VENOUS BLD VENIPUNCTURE: CPT

## 2022-08-09 PROCEDURE — 82043 UR ALBUMIN QUANTITATIVE: CPT

## 2022-08-29 ENCOUNTER — TELEPHONE (OUTPATIENT)
Dept: FAMILY MEDICINE CLINIC | Facility: CLINIC | Age: 67
End: 2022-08-29

## 2022-08-29 NOTE — TELEPHONE ENCOUNTER
Using language line : [place] ID number [id number]      Good rx is not cheaper and patient can not use manufacture coupon with medicare. Advised patient to contact insurance for covered/preferred alternative. Patient verbalized understanding.

## 2022-09-12 DIAGNOSIS — I10 ESSENTIAL HYPERTENSION: ICD-10-CM

## 2022-09-12 NOTE — TELEPHONE ENCOUNTER
Protocol failed or has No Protocol, please review  Requested Prescriptions   Pending Prescriptions Disp Refills    lisinopril-hydroCHLOROthiazide 20-12.5 MG Oral Tab 90 tablet 1     Sig: Take 1 tablet by mouth daily.         Hypertensive Medications Protocol Failed - 9/12/2022 10:47 AM        Failed - Last BP reading less than 140/90     BP Readings from Last 1 Encounters:  08/09/22 : 153/89                Passed - In person appointment in the past 12 or next 3 months       Recent Outpatient Visits              1 month ago Type 2 diabetes mellitus with hyperglycemia, without long-term current use of insulin [facility])    Sherry Méndez, Russel Guzmán MD    Office Visit    6 months ago Encounter for annual health examination    [facility], Mera Hernandez MD    Office Visit    6 months ago Closed nondisplaced fracture of lateral malleolus of right fibula with routine healing, subsequent encounter    [facility]    Office Visit    6 months ago     [facility] in [place], [place]    Office Visit    6 months ago     [facility] in [place], [place]    Office Visit     Future Appointments         Provider Department Appt Notes    In 2 months MD Vincent Rosales [address], Russel f/u cholesterol diabetes per Dr. Michelle Johnson or BMP in past 6 months     Recent Results (from the past 4392 hour(s))   COMP METABOLIC PANEL (14)    Collection Time: 08/09/22 10:08 AM   Result Value Ref Range    Glucose 121 (H) 70 - 99 mg/dL    Sodium 141 136 - 145 mmol/L    Potassium 4.5 3.5 - 5.1 mmol/L    Chloride 107 98 - 112 mmol/L    CO2 25.0 21.0 - 32.0 mmol/L    Anion Gap 9 0 - 18 mmol/L    BUN 21 (H) 7 - 18 mg/dL    Creatinine 0.74 0.55 - 1.02 mg/dL    BUN/CREA Ratio 28.4 (H) 10.0 - 20.0    Calcium, Total 9.8 8.5 - 10.1 mg/dL    Calculated Osmolality 296 (H) 275 - 295 mOsm/kg    eGFR-Cr 89 >=60 mL/min/1.73m2    ALT 38 13 - 56 U/L    AST 22 15 - 37 U/L    Alkaline Phosphatase 88 55 - 142 U/L    Bilirubin, Total 0.5 0.1 - 2.0 mg/dL    Total Protein 7.8 6.4 - 8.2 g/dL    Albumin 3.9 3.4 - 5.0 g/dL    Globulin  3.9 2.8 - 4.4 g/dL    A/G Ratio 1.0 1.0 - 2.0    Patient Fasting for CMP? Yes      *Note: Due to a large number of results and/or encounters for the requested time period, some results have not been displayed. A complete set of results can be found in Results Review.                  Passed - In person appointment or virtual visit in the past 6 months       Recent Outpatient Visits              1 month ago Type 2 diabetes mellitus with hyperglycemia, without long-term current use of insulin [facility])    [address], Eh Hanna MD    Office Visit    6 months ago Encounter for annual health examination    [address] [place], Eh Hanna MD    Office Visit    6 months ago Closed nondisplaced fracture of lateral malleolus of right fibula with routine healing, subsequent encounter    [address] [facility] Jorge Valerio DO    Office Visit    6 months ago     [facility] in [place], [place]    Office Visit    6 months ago     [facility] in [place], [place]    Office Visit     Future Appointments         Provider Department Appt Notes    In 2 months Maddi Connolly MD [address], [place] f/u cholesterol diabetes per Dr. Bernard Roman - GFR > 50     Lab Results   Component Value Date    [facility] 08/09/2022                     Future Appointments         Provider Department Appt Notes    In 2 months Maddi Connolly MD [address], [place] f/u cholesterol diabetes per Dr. Clarke Luna Visits              1 month ago Type 2 diabetes mellitus with hyperglycemia, without long-term current use of insulin [facility])    150 Russel Perry MD    Office Visit    6 months ago Encounter for annual health examination    [address] Perry MD    Office Visit    6 months ago Closed nondisplaced fracture of lateral malleolus of right fibula with routine healing, subsequent encounter    [facility] [place], Orthopedics Carlos Jensen DO    Office Visit    6 months ago     [facility] in [place], [place]    Office Visit    6 months ago     [facility] in [place], [place]    Office Visit

## 2022-09-12 NOTE — TELEPHONE ENCOUNTER
Patient is requesting a refill for lisinopril-hydroCHLOROthiazide 20-12.5 MG Oral Tab. Please advise. Patient was instructed to contact pcp for refill by pharmacy.

## 2022-09-13 RX ORDER — LISINOPRIL AND HYDROCHLOROTHIAZIDE 20; 12.5 MG/1; MG/1
1 TABLET ORAL DAILY
Qty: 90 TABLET | Refills: 1 | Status: SHIPPED | OUTPATIENT
Start: 2022-09-13

## 2022-11-03 ENCOUNTER — OFFICE VISIT (OUTPATIENT)
Dept: SURGERY | Facility: CLINIC | Age: 67
End: 2022-11-03
Payer: MEDICARE

## 2022-11-03 VITALS — BODY MASS INDEX: 28.53 KG/M2 | HEIGHT: 63 IN | WEIGHT: 161 LBS

## 2022-11-03 DIAGNOSIS — M79.89 SOFT TISSUE MASS: Primary | ICD-10-CM

## 2022-11-03 PROCEDURE — 99204 OFFICE O/P NEW MOD 45 MIN: CPT | Performed by: SURGERY

## 2022-11-11 ENCOUNTER — OFFICE VISIT (OUTPATIENT)
Dept: FAMILY MEDICINE CLINIC | Facility: CLINIC | Age: 67
End: 2022-11-11
Payer: MEDICARE

## 2022-11-11 VITALS
BODY MASS INDEX: 28.7 KG/M2 | HEIGHT: 63 IN | WEIGHT: 162 LBS | DIASTOLIC BLOOD PRESSURE: 87 MMHG | HEART RATE: 60 BPM | TEMPERATURE: 97 F | SYSTOLIC BLOOD PRESSURE: 138 MMHG

## 2022-11-11 DIAGNOSIS — E11.65 TYPE 2 DIABETES MELLITUS WITH HYPERGLYCEMIA, WITHOUT LONG-TERM CURRENT USE OF INSULIN (HCC): Primary | ICD-10-CM

## 2022-11-11 DIAGNOSIS — R52 TOTAL BODY PAIN: ICD-10-CM

## 2022-11-11 DIAGNOSIS — I10 ESSENTIAL HYPERTENSION: ICD-10-CM

## 2022-11-11 DIAGNOSIS — G47.00 INSOMNIA, UNSPECIFIED TYPE: ICD-10-CM

## 2022-11-11 DIAGNOSIS — E11.42 DIABETIC POLYNEUROPATHY ASSOCIATED WITH TYPE 2 DIABETES MELLITUS (HCC): ICD-10-CM

## 2022-11-11 LAB
CARTRIDGE LOT#: ABNORMAL NUMERIC
HEMOGLOBIN A1C: 8.5 % (ref 4.3–5.6)

## 2022-11-11 PROCEDURE — 83036 HEMOGLOBIN GLYCOSYLATED A1C: CPT | Performed by: FAMILY MEDICINE

## 2022-11-11 PROCEDURE — 99214 OFFICE O/P EST MOD 30 MIN: CPT | Performed by: FAMILY MEDICINE

## 2022-11-11 RX ORDER — PIOGLITAZONEHYDROCHLORIDE 45 MG/1
45 TABLET ORAL DAILY
Qty: 90 TABLET | Refills: 1 | Status: SHIPPED | OUTPATIENT
Start: 2022-11-11

## 2022-11-11 RX ORDER — DULOXETIN HYDROCHLORIDE 30 MG/1
30 CAPSULE, DELAYED RELEASE ORAL DAILY
Qty: 90 CAPSULE | Refills: 1 | Status: SHIPPED | OUTPATIENT
Start: 2022-11-11

## 2022-12-09 ENCOUNTER — APPOINTMENT (OUTPATIENT)
Dept: MAMMOGRAPHY | Age: 67
End: 2022-12-09
Attending: INTERNAL MEDICINE

## 2022-12-15 ENCOUNTER — HOSPITAL ENCOUNTER (OUTPATIENT)
Dept: MAMMOGRAPHY | Age: 67
Discharge: HOME | End: 2022-12-15
Attending: INTERNAL MEDICINE

## 2022-12-15 ENCOUNTER — APPOINTMENT (OUTPATIENT)
Dept: RADIATION ONCOLOGY | Age: 67
End: 2022-12-15

## 2022-12-15 PROCEDURE — 77063 BREAST TOMOSYNTHESIS BI: CPT

## 2022-12-23 ENCOUNTER — HOSPITAL ENCOUNTER (OUTPATIENT)
Dept: RADIATION ONCOLOGY | Age: 67
Discharge: HOME | End: 2022-12-23
Attending: RADIOLOGY

## 2022-12-23 VITALS
SYSTOLIC BLOOD PRESSURE: 130 MMHG | WEIGHT: 162.04 LBS | BODY MASS INDEX: 28.28 KG/M2 | HEART RATE: 67 BPM | RESPIRATION RATE: 17 BRPM | DIASTOLIC BLOOD PRESSURE: 84 MMHG | OXYGEN SATURATION: 99 % | TEMPERATURE: 97.5 F

## 2022-12-23 PROCEDURE — 99212 OFFICE O/P EST SF 10 MIN: CPT

## 2022-12-23 RX ORDER — PIOGLITAZONEHYDROCHLORIDE 45 MG/1
45 TABLET ORAL
COMMUNITY
Start: 2022-11-11

## 2022-12-23 ASSESSMENT — ENCOUNTER SYMPTOMS
RESPIRATORY NEGATIVE: 1
HEMATOLOGIC/LYMPHATIC NEGATIVE: 1
CONSTITUTIONAL NEGATIVE: 1
NEUROLOGICAL NEGATIVE: 1
PSYCHIATRIC NEGATIVE: 1
GASTROINTESTINAL NEGATIVE: 1
EYES NEGATIVE: 1
ENDOCRINE NEGATIVE: 1

## 2022-12-23 ASSESSMENT — PATIENT HEALTH QUESTIONNAIRE - PHQ9
1. LITTLE INTEREST OR PLEASURE IN DOING THINGS: NOT AT ALL
2. FEELING DOWN, DEPRESSED OR HOPELESS: NOT AT ALL
SUM OF ALL RESPONSES TO PHQ9 QUESTIONS 1 AND 2: 0
SUM OF ALL RESPONSES TO PHQ9 QUESTIONS 1 AND 2: 0
CLINICAL INTERPRETATION OF PHQ2 SCORE: NO FURTHER SCREENING NEEDED

## 2022-12-23 ASSESSMENT — PAIN SCALES - GENERAL: PAINLEVEL: 0

## 2022-12-27 ENCOUNTER — MED REC SCAN ONLY (OUTPATIENT)
Dept: FAMILY MEDICINE CLINIC | Facility: CLINIC | Age: 67
End: 2022-12-27

## 2023-01-08 ENCOUNTER — APPOINTMENT (OUTPATIENT)
Dept: GENERAL RADIOLOGY | Age: 68
End: 2023-01-08
Attending: PHYSICIAN ASSISTANT
Payer: MEDICARE

## 2023-01-08 ENCOUNTER — HOSPITAL ENCOUNTER (OUTPATIENT)
Age: 68
Discharge: HOME | End: 2023-01-08
Payer: MEDICARE

## 2023-01-08 VITALS
TEMPERATURE: 98 F | SYSTOLIC BLOOD PRESSURE: 113 MMHG | DIASTOLIC BLOOD PRESSURE: 64 MMHG | OXYGEN SATURATION: 100 % | RESPIRATION RATE: 16 BRPM | HEART RATE: 74 BPM

## 2023-01-08 DIAGNOSIS — M54.6 ACUTE RIGHT-SIDED THORACIC BACK PAIN: Primary | ICD-10-CM

## 2023-01-08 DIAGNOSIS — E11.65 TYPE 2 DIABETES MELLITUS WITH HYPERGLYCEMIA, WITHOUT LONG-TERM CURRENT USE OF INSULIN (HCC): ICD-10-CM

## 2023-01-08 DIAGNOSIS — I10 ESSENTIAL HYPERTENSION: ICD-10-CM

## 2023-01-08 PROCEDURE — 99203 OFFICE O/P NEW LOW 30 MIN: CPT

## 2023-01-08 PROCEDURE — 99213 OFFICE O/P EST LOW 20 MIN: CPT

## 2023-01-08 PROCEDURE — 72072 X-RAY EXAM THORAC SPINE 3VWS: CPT | Performed by: PHYSICIAN ASSISTANT

## 2023-01-08 RX ORDER — VALACYCLOVIR HYDROCHLORIDE 1 G/1
1000 TABLET, FILM COATED ORAL 3 TIMES DAILY
Qty: 21 TABLET | Refills: 0 | Status: SHIPPED | OUTPATIENT
Start: 2023-01-08 | End: 2023-01-15

## 2023-01-08 RX ORDER — NAPROXEN 500 MG/1
500 TABLET ORAL 2 TIMES DAILY PRN
Qty: 10 TABLET | Refills: 0 | Status: SHIPPED | OUTPATIENT
Start: 2023-01-08 | End: 2023-01-13

## 2023-01-08 RX ORDER — DIAZEPAM 2 MG/1
TABLET ORAL 3 TIMES DAILY PRN
Qty: 20 TABLET | Refills: 0 | Status: SHIPPED | OUTPATIENT
Start: 2023-01-08

## 2023-01-08 NOTE — ED INITIAL ASSESSMENT (HPI)
Pt comes in for eval of R shoulder/back pain that started day before yesterday. Denies injury or trauma. Reports pain has spread to R arm and also reports itching to back as well. Denies rash. NV intact.

## 2023-01-09 RX ORDER — LISINOPRIL AND HYDROCHLOROTHIAZIDE 20; 12.5 MG/1; MG/1
TABLET ORAL
Qty: 90 TABLET | Refills: 1 | Status: SHIPPED | OUTPATIENT
Start: 2023-01-09

## 2023-02-07 ENCOUNTER — TELEPHONE (OUTPATIENT)
Dept: SURGERY | Facility: CLINIC | Age: 68
End: 2023-02-07

## 2023-02-07 NOTE — TELEPHONE ENCOUNTER
Called patient with [employer] intrpreter #[phone] and scheduled for excision lipoma's, L arm ( L triceps, and L forearm Thus 3/2/23 @ 2:00.

## 2023-03-09 ENCOUNTER — OFFICE VISIT (OUTPATIENT)
Dept: HEMATOLOGY/ONCOLOGY | Age: 68
End: 2023-03-09
Attending: INTERNAL MEDICINE

## 2023-03-09 VITALS
RESPIRATION RATE: 18 BRPM | TEMPERATURE: 97.5 F | HEART RATE: 65 BPM | BODY MASS INDEX: 29.17 KG/M2 | SYSTOLIC BLOOD PRESSURE: 132 MMHG | DIASTOLIC BLOOD PRESSURE: 71 MMHG | OXYGEN SATURATION: 99 % | WEIGHT: 167.11 LBS

## 2023-03-09 DIAGNOSIS — Z12.31 ENCOUNTER FOR SCREENING MAMMOGRAM FOR MALIGNANT NEOPLASM OF BREAST: Primary | ICD-10-CM

## 2023-03-09 DIAGNOSIS — R92.2 INCONCLUSIVE MAMMOGRAM: ICD-10-CM

## 2023-03-09 DIAGNOSIS — R92.30 DENSE BREAST TISSUE ON MAMMOGRAM: ICD-10-CM

## 2023-03-09 DIAGNOSIS — Z85.3 HISTORY OF BREAST CANCER IN FEMALE: ICD-10-CM

## 2023-03-09 PROCEDURE — 99213 OFFICE O/P EST LOW 20 MIN: CPT | Performed by: INTERNAL MEDICINE

## 2023-03-09 PROCEDURE — 99211 OFF/OP EST MAY X REQ PHY/QHP: CPT

## 2023-03-09 PROCEDURE — 99212 OFFICE O/P EST SF 10 MIN: CPT

## 2023-03-09 ASSESSMENT — PATIENT HEALTH QUESTIONNAIRE - PHQ9
SUM OF ALL RESPONSES TO PHQ9 QUESTIONS 1 AND 2: 0
1. LITTLE INTEREST OR PLEASURE IN DOING THINGS: NOT AT ALL
CLINICAL INTERPRETATION OF PHQ2 SCORE: NO FURTHER SCREENING NEEDED
SUM OF ALL RESPONSES TO PHQ9 QUESTIONS 1 AND 2: 0
2. FEELING DOWN, DEPRESSED OR HOPELESS: NOT AT ALL

## 2023-03-09 ASSESSMENT — PAIN SCALES - GENERAL: PAINLEVEL: 10

## 2023-03-17 ENCOUNTER — LAB ENCOUNTER (OUTPATIENT)
Dept: LAB | Age: 68
End: 2023-03-17
Attending: FAMILY MEDICINE
Payer: MEDICARE

## 2023-03-17 ENCOUNTER — OFFICE VISIT (OUTPATIENT)
Dept: FAMILY MEDICINE CLINIC | Facility: CLINIC | Age: 68
End: 2023-03-17

## 2023-03-17 VITALS
WEIGHT: 164 LBS | HEART RATE: 58 BPM | TEMPERATURE: 98 F | DIASTOLIC BLOOD PRESSURE: 85 MMHG | SYSTOLIC BLOOD PRESSURE: 134 MMHG | BODY MASS INDEX: 29 KG/M2

## 2023-03-17 DIAGNOSIS — Z23 NEED FOR PNEUMOCOCCAL VACCINE: ICD-10-CM

## 2023-03-17 DIAGNOSIS — N39.41 URGE INCONTINENCE: ICD-10-CM

## 2023-03-17 DIAGNOSIS — R92.2 DENSE BREAST TISSUE ON MAMMOGRAM: ICD-10-CM

## 2023-03-17 DIAGNOSIS — Z78.0 POST-MENOPAUSAL: ICD-10-CM

## 2023-03-17 DIAGNOSIS — Z00.00 ENCOUNTER FOR ANNUAL HEALTH EXAMINATION: ICD-10-CM

## 2023-03-17 DIAGNOSIS — Z00.00 ENCOUNTER FOR ANNUAL HEALTH EXAMINATION: Primary | ICD-10-CM

## 2023-03-17 DIAGNOSIS — I10 ESSENTIAL HYPERTENSION: ICD-10-CM

## 2023-03-17 DIAGNOSIS — C50.912 MALIGNANT NEOPLASM OF LEFT FEMALE BREAST, UNSPECIFIED ESTROGEN RECEPTOR STATUS, UNSPECIFIED SITE OF BREAST (HCC): ICD-10-CM

## 2023-03-17 DIAGNOSIS — E78.2 MIXED HYPERLIPIDEMIA: ICD-10-CM

## 2023-03-17 DIAGNOSIS — E11.65 TYPE 2 DIABETES MELLITUS WITH HYPERGLYCEMIA, WITHOUT LONG-TERM CURRENT USE OF INSULIN (HCC): ICD-10-CM

## 2023-03-17 PROBLEM — R92.30 DENSE BREAST TISSUE ON MAMMOGRAM: Status: ACTIVE | Noted: 2023-03-09

## 2023-03-17 LAB
ALBUMIN SERPL-MCNC: 3.5 G/DL (ref 3.4–5)
ALBUMIN/GLOB SERPL: 0.9 {RATIO} (ref 1–2)
ALP LIVER SERPL-CCNC: 93 U/L
ALT SERPL-CCNC: 24 U/L
ANION GAP SERPL CALC-SCNC: 7 MMOL/L (ref 0–18)
AST SERPL-CCNC: 18 U/L (ref 15–37)
BASOPHILS # BLD AUTO: 0.05 X10(3) UL (ref 0–0.2)
BASOPHILS NFR BLD AUTO: 0.9 %
BILIRUB SERPL-MCNC: 0.5 MG/DL (ref 0.1–2)
BUN BLD-MCNC: 20 MG/DL (ref 7–18)
BUN/CREAT SERPL: 26.3 (ref 10–20)
CALCIUM BLD-MCNC: 8.8 MG/DL (ref 8.5–10.1)
CHLORIDE SERPL-SCNC: 104 MMOL/L (ref 98–112)
CHOLEST SERPL-MCNC: 142 MG/DL (ref ?–200)
CO2 SERPL-SCNC: 27 MMOL/L (ref 21–32)
CREAT BLD-MCNC: 0.76 MG/DL
DEPRECATED RDW RBC AUTO: 43.2 FL (ref 35.1–46.3)
EOSINOPHIL # BLD AUTO: 0.08 X10(3) UL (ref 0–0.7)
EOSINOPHIL NFR BLD AUTO: 1.4 %
ERYTHROCYTE [DISTWIDTH] IN BLOOD BY AUTOMATED COUNT: 12.9 % (ref 11–15)
EST. AVERAGE GLUCOSE BLD GHB EST-MCNC: 183 MG/DL (ref 68–126)
FASTING PATIENT LIPID ANSWER: YES
FASTING STATUS PATIENT QL REPORTED: YES
GFR SERPLBLD BASED ON 1.73 SQ M-ARVRAT: 86 ML/MIN/1.73M2 (ref 60–?)
GLOBULIN PLAS-MCNC: 3.8 G/DL (ref 2.8–4.4)
GLUCOSE BLD-MCNC: 140 MG/DL (ref 70–99)
HBA1C MFR BLD: 8 % (ref ?–5.7)
HCT VFR BLD AUTO: 38.3 %
HDLC SERPL-MCNC: 49 MG/DL (ref 40–59)
HGB BLD-MCNC: 12.2 G/DL
IMM GRANULOCYTES # BLD AUTO: 0.03 X10(3) UL (ref 0–1)
IMM GRANULOCYTES NFR BLD: 0.5 %
LDLC SERPL CALC-MCNC: 71 MG/DL (ref ?–100)
LYMPHOCYTES # BLD AUTO: 2.09 X10(3) UL (ref 1–4)
LYMPHOCYTES NFR BLD AUTO: 35.5 %
MCH RBC QN AUTO: 29.5 PG (ref 26–34)
MCHC RBC AUTO-ENTMCNC: 31.9 G/DL (ref 31–37)
MCV RBC AUTO: 92.5 FL
MONOCYTES # BLD AUTO: 0.49 X10(3) UL (ref 0.1–1)
MONOCYTES NFR BLD AUTO: 8.3 %
NEUTROPHILS # BLD AUTO: 3.14 X10 (3) UL (ref 1.5–7.7)
NEUTROPHILS # BLD AUTO: 3.14 X10(3) UL (ref 1.5–7.7)
NEUTROPHILS NFR BLD AUTO: 53.4 %
NONHDLC SERPL-MCNC: 93 MG/DL (ref ?–130)
OSMOLALITY SERPL CALC.SUM OF ELEC: 291 MOSM/KG (ref 275–295)
PLATELET # BLD AUTO: 211 10(3)UL (ref 150–450)
POTASSIUM SERPL-SCNC: 3.7 MMOL/L (ref 3.5–5.1)
PROT SERPL-MCNC: 7.3 G/DL (ref 6.4–8.2)
RBC # BLD AUTO: 4.14 X10(6)UL
SODIUM SERPL-SCNC: 138 MMOL/L (ref 136–145)
TRIGL SERPL-MCNC: 126 MG/DL (ref 30–149)
TSI SER-ACNC: 2.8 MIU/ML (ref 0.36–3.74)
VLDLC SERPL CALC-MCNC: 19 MG/DL (ref 0–30)
WBC # BLD AUTO: 5.9 X10(3) UL (ref 4–11)

## 2023-03-17 PROCEDURE — 36415 COLL VENOUS BLD VENIPUNCTURE: CPT

## 2023-03-17 PROCEDURE — 80053 COMPREHEN METABOLIC PANEL: CPT

## 2023-03-17 PROCEDURE — 84443 ASSAY THYROID STIM HORMONE: CPT

## 2023-03-17 PROCEDURE — 80061 LIPID PANEL: CPT

## 2023-03-17 PROCEDURE — 83036 HEMOGLOBIN GLYCOSYLATED A1C: CPT

## 2023-03-17 PROCEDURE — 85025 COMPLETE CBC W/AUTO DIFF WBC: CPT

## 2023-03-17 RX ORDER — PEN NEEDLE, DIABETIC 30 GX3/16"
1 NEEDLE, DISPOSABLE MISCELLANEOUS
Qty: 30 EACH | Refills: 0 | Status: SHIPPED | OUTPATIENT
Start: 2023-03-17

## 2023-03-17 RX ORDER — OXYBUTYNIN CHLORIDE 5 MG/1
5 TABLET ORAL DAILY PRN
Qty: 90 TABLET | Refills: 1 | Status: SHIPPED | OUTPATIENT
Start: 2023-03-17

## 2023-03-17 RX ORDER — SEMAGLUTIDE 1.34 MG/ML
0.25 INJECTION, SOLUTION SUBCUTANEOUS
Qty: 1.5 ML | Refills: 3 | Status: SHIPPED | OUTPATIENT
Start: 2023-03-17 | End: 2023-04-14

## 2023-04-03 ENCOUNTER — TELEPHONE (OUTPATIENT)
Dept: FAMILY MEDICINE CLINIC | Facility: CLINIC | Age: 68
End: 2023-04-03

## 2023-04-03 DIAGNOSIS — E11.65 TYPE 2 DIABETES MELLITUS WITH HYPERGLYCEMIA, WITHOUT LONG-TERM CURRENT USE OF INSULIN (HCC): Primary | ICD-10-CM

## 2023-04-03 NOTE — TELEPHONE ENCOUNTER
----- Message from Judi Billings MD sent at 4/3/2023  1:22 PM CDT -----  Results reviewed. Please inform patient that her diabetes control has stayed stable improved slightly. Liver, kidney, thyroid, cholesterol and blood counts normal.  Would repeat testing in 3-6 months.

## 2023-04-03 NOTE — TELEPHONE ENCOUNTER
Spoke to patient and informed her of note below. Patient voiced understanding. No other questions/concerns at this time.

## 2023-04-03 NOTE — TELEPHONE ENCOUNTER
Full name and  verified with pt. Pt was informed below message. Pt verbalized understanding. Pt stated she, did not  her last Rx for OZEMPIC, 0.25 OR 0.5 . Pt insurance would only cover 50%. She would need to pay over $500, out of pocket. Please Advs. Dr. Jenna West.

## 2023-04-03 NOTE — TELEPHONE ENCOUNTER
For now we will have patient try and  Januvia tablets to take daily. At next office visit can discuss trying a different injectable. She also touch base with her insurance to see if Ozempic is not covered than which non-insulin injectables for DM are covered. Thank you!

## 2023-04-08 ENCOUNTER — TELEPHONE (OUTPATIENT)
Dept: FAMILY MEDICINE CLINIC | Facility: CLINIC | Age: 68
End: 2023-04-08

## 2023-04-08 DIAGNOSIS — E11.65 TYPE 2 DIABETES MELLITUS WITH HYPERGLYCEMIA, WITHOUT LONG-TERM CURRENT USE OF INSULIN (HCC): Primary | ICD-10-CM

## 2023-04-10 RX ORDER — GLIPIZIDE 5 MG/1
5 TABLET ORAL
Qty: 90 TABLET | Refills: 3 | Status: SHIPPED | OUTPATIENT
Start: 2023-04-10

## 2023-04-10 NOTE — TELEPHONE ENCOUNTER
Type 2 diabetes mellitus with hyperglycemia, without long-term current use of insulin (HCC)  - glipiZIDE 5 MG Oral Tab; Take 1 tablet (5 mg total) by mouth every morning before breakfast.  Dispense: 90 tablet;  Refill: 3      Sending new med, thank you

## 2023-04-11 ENCOUNTER — TELEPHONE (OUTPATIENT)
Dept: SURGERY | Facility: CLINIC | Age: 68
End: 2023-04-11

## 2023-04-11 NOTE — TELEPHONE ENCOUNTER
OK, noted. Will reschedule patient when she requests.
Pt states received a call today from someone asking to change her procedure date to tomorrow no notes in system please advise       Language line : [id number]/Emily
Pt states she just wants to cancel out the procedure all together because she doesn't know how to get to [facility] location please advise       [id number]/Emily
No complaints

## 2023-05-31 DIAGNOSIS — E11.65 TYPE 2 DIABETES MELLITUS WITH HYPERGLYCEMIA, WITHOUT LONG-TERM CURRENT USE OF INSULIN (HCC): ICD-10-CM

## 2023-05-31 DIAGNOSIS — I10 ESSENTIAL HYPERTENSION: ICD-10-CM

## 2023-06-01 RX ORDER — LISINOPRIL AND HYDROCHLOROTHIAZIDE 20; 12.5 MG/1; MG/1
1 TABLET ORAL DAILY
Qty: 90 TABLET | Refills: 3 | Status: SHIPPED | OUTPATIENT
Start: 2023-06-01

## 2023-06-01 NOTE — TELEPHONE ENCOUNTER
Please review. Protocol failed / Has no protocol. Routing as Dr. Neelima Montes is out of office. Requested Prescriptions   Pending Prescriptions Disp Refills    METFORMIN 500 MG Oral Tab [Pharmacy Med Name: METFORMIN  MG TABLET] 180 tablet 1     Sig: TOME AFSANEH TABLETA DOS VECES AL NOEMY CON ALIMENTO       Diabetes Medication Protocol Failed - 5/31/2023  3:49 AM        Failed - Last A1C < 7.5 and within past 6 months     Lab Results   Component Value Date    A1C 8.0 (H) 03/17/2023               Passed - In person appointment or virtual visit in the past 6 mos or appointment in next 3 mos     Recent Outpatient Visits              2 months ago Encounter for annual health examination    [address] MD    Office Visit    6 months ago Type 2 diabetes mellitus with hyperglycemia, without long-term current use of insulin [facility])    [address] MD    Office Visit    7 months ago Soft tissue mass    [address], Jennifer Sprague MD    Office Visit    9 months ago Type 2 diabetes mellitus with hyperglycemia, without long-term current use of insulin [facility])    [address] MD    Office Visit    1 year ago Encounter for annual health examination    [address]e Lopez MD    Office Visit                      Passed - EGFRCR or GFRNAA > 50     GFR Evaluation  EGFRCR: 86 , resulted on 3/17/2023            Passed - GFR in the past 12 months         Signed Prescriptions Disp Refills    lisinopril-hydroCHLOROthiazide 20-12.5 MG Oral Tab 90 tablet 3     Sig: Take 1 tablet by mouth daily.        Hypertensive Medications Protocol Passed - 5/31/2023  3:49 AM        Passed - In person appointment in the past 12 or next 3 months     Recent Outpatient Visits              2 months ago Encounter for annual health examination    Debbie Stewart MD    Office Visit    6 months ago Type 2 diabetes mellitus with hyperglycemia, without long-term current use of insulin [facility])    Debbie Stewart MD    Office Visit    7 months ago Soft tissue mass    [address], [address], Sigrid Lala MD    Office Visit    9 months ago Type 2 diabetes mellitus with hyperglycemia, without long-term current use of insulin ([facility])    [address], [address], Debbie Ovalle MD    Office Visit    1 year ago Encounter for annual health examination    [address] [place], Brandon Dixon MD    Office Visit                      Passed - Last BP reading less than 140/90     BP Readings from Last 1 Encounters:  03/17/23 : 134/85                Passed - CMP or BMP in past 6 months     Recent Results (from the past 4392 hour(s))   COMP METABOLIC PANEL (14)    Collection Time: 03/17/23 10:29 AM   Result Value Ref Range    Glucose 140 (H) 70 - 99 mg/dL    Sodium 138 136 - 145 mmol/L    Potassium 3.7 3.5 - 5.1 mmol/L    Chloride 104 98 - 112 mmol/L    CO2 27.0 21.0 - 32.0 mmol/L    Anion Gap 7 0 - 18 mmol/L    BUN 20 (H) 7 - 18 mg/dL    Creatinine 0.76 0.55 - 1.02 mg/dL    BUN/CREA Ratio 26.3 (H) 10.0 - 20.0    Calcium, Total 8.8 8.5 - 10.1 mg/dL    Calculated Osmolality 291 275 - 295 mOsm/kg    eGFR-Cr 86 >=60 mL/min/1.73m2    ALT 24 13 - 56 U/L    AST 18 15 - 37 U/L    Alkaline Phosphatase 93 55 - 142 U/L    Bilirubin, Total 0.5 0.1 - 2.0 mg/dL    Total Protein 7.3 6.4 - 8.2 g/dL    Albumin 3.5 3.4 - 5.0 g/dL    Globulin  3.8 2.8 - 4.4 g/dL    A/G Ratio 0.9 (L) 1.0 - 2.0    Patient Fasting for CMP? Yes      *Note: Due to a large number of results and/or encounters for the requested time period, some results have not been displayed.  A complete set of results can be found in Results Review.                  Passed - In person appointment or virtual visit in the past 6 months     Recent Outpatient Visits              2 months ago Encounter for annual health examination    Russel Carlton MD    Office Visit    6 months ago Type 2 diabetes mellitus with hyperglycemia, without long-term current use of insulin [facility])    Galen Carlton MD    Office Visit    7 months ago Soft tissue mass    Hien Carlton MD    Office Visit    9 months ago Type 2 diabetes mellitus with hyperglycemia, without long-term current use of insulin [facility])    Galen Carlton MD    Office Visit    1 year ago Encounter for annual health examination    Galen Carlton MD    Office Visit                      Passed - EGFRCR or GFRNAA > 50     GFR Evaluation  EGFRCR: 86 , resulted on 3/17/2023                  Recent Outpatient Visits              2 months ago Encounter for annual health examination    Galen Carlton MD    Office Visit    6 months ago Type 2 diabetes mellitus with hyperglycemia, without long-term current use of insulin [facility])    Galen Carlton MD    Office Visit    7 months ago Soft tissue mass    Hien Carlton MD    Office Visit    9 months ago Type 2 diabetes mellitus with hyperglycemia, without long-term current use of insulin [facility])    Galen Carlton MD    Office Visit    1 year ago Encounter for annual health examination    Russel Carlton Agatha Carpio MD    Office Visit

## 2023-06-01 NOTE — TELEPHONE ENCOUNTER
Refill passed per [employer], [facility] protocol.     Requested Prescriptions   Pending Prescriptions Disp Refills    LISINOPRIL-HYDROCHLOROTHIAZIDE 20-12.5 MG Oral Tab [Pharmacy Med Name: LISINOPRIL-HCTZ 20-12.5 MG TAB] 90 tablet 1     Sig: ANDRE BROWN       Hypertensive Medications Protocol Passed - 5/31/2023  3:49 AM        Passed - In person appointment in the past 12 or next 3 months     Recent Outpatient Visits              2 months ago Encounter for annual health examination    José Benavides MD    Office Visit    6 months ago Type 2 diabetes mellitus with hyperglycemia, without long-term current use of insulin ([facility] [address]    José Benavides MD    Office Visit    7 months ago Soft tissue mass    Shaista Benavides MD    Office Visit    9 months ago Type 2 diabetes mellitus with hyperglycemia, without long-term current use of insulin ([facility])    José Benavides MD    Office Visit    1 year ago Encounter for annual health examination    Darya Benavides Amelie Donna, MD    Office Visit                      Passed - Last BP reading less than 140/90     BP Readings from Last 1 Encounters:  03/17/23 : 134/85                Passed - CMP or BMP in past 6 months     Recent Results (from the past 4392 hour(s))   COMP METABOLIC PANEL (14)    Collection Time: 03/17/23 10:29 AM   Result Value Ref Range    Glucose 140 (H) 70 - 99 mg/dL    Sodium 138 136 - 145 mmol/L    Potassium 3.7 3.5 - 5.1 mmol/L    Chloride 104 98 - 112 mmol/L    CO2 27.0 21.0 - 32.0 mmol/L    Anion Gap 7 0 - 18 mmol/L    BUN 20 (H) 7 - 18 mg/dL    Creatinine 0.76 0.55 - 1.02 mg/dL    BUN/CREA Ratio 26.3 (H) 10.0 - 20.0    Calcium, Total 8.8 8.5 - 10.1 mg/dL    Calculated Osmolality 291 275 - 295 mOsm/kg    eGFR-Cr 86 >=60 mL/min/1.73m2    ALT 24 13 - 56 U/L    AST 18 15 - 37 U/L    Alkaline Phosphatase 93 55 - 142 U/L    Bilirubin, Total 0.5 0.1 - 2.0 mg/dL    Total Protein 7.3 6.4 - 8.2 g/dL    Albumin 3.5 3.4 - 5.0 g/dL    Globulin  3.8 2.8 - 4.4 g/dL    A/G Ratio 0.9 (L) 1.0 - 2.0    Patient Fasting for CMP? Yes      *Note: Due to a large number of results and/or encounters for the requested time period, some results have not been displayed. A complete set of results can be found in Results Review.                  Passed - In person appointment or virtual visit in the past 6 months     Recent Outpatient Visits              2 months ago Encounter for annual health examination    Russel De La Garza MD    Office Visit    6 months ago Type 2 diabetes mellitus with hyperglycemia, without long-term current use of insulin [facility])    Daniela De La Garza MD    Office Visit    7 months ago Soft tissue mass    Malka De La Garza MD    Office Visit    9 months ago Type 2 diabetes mellitus with hyperglycemia, without long-term current use of insulin [facility])    Daniela De La Garza MD    Office Visit    1 year ago Encounter for annual health examination    Daniela De La Garza MD    Office Visit                      Passed - EGFRCR or GFRNAA > 50     GFR Evaluation  EGFRCR: 86 , resulted on 3/17/2023              METFORMIN 500 MG Oral Tab [Pharmacy Med Name: METFORMIN  MG TABLET] 180 tablet 1     Sig: TOME AFSANEH TABLETA DOS VECES AL NOEMY CON ALIMENTO       Diabetes Medication Protocol Failed - 5/31/2023  3:49 AM        Failed - Last A1C < 7.5 and within past 6 months     Lab Results   Component Value Date    A1C 8.0 (H) 03/17/2023               Passed - In person appointment or virtual visit in the past 6 mos or appointment in next 3 mos     Recent Outpatient Visits              2 months ago Encounter for annual health examination    Mae Jimenez MD    Office Visit    6 months ago Type 2 diabetes mellitus with hyperglycemia, without long-term current use of insulin [facility])    Mae Jimenez MD    Office Visit    7 months ago Soft tissue mass    Nasir Jimenez MD    Office Visit    9 months ago Type 2 diabetes mellitus with hyperglycemia, without long-term current use of insulin [facility])    Mae Jimenez MD    Office Visit    1 year ago Encounter for annual health examination    Mae Jimenez MD    Office Visit                      Passed - EGFRCR or GFRNAA > 50     GFR Evaluation  EGFRCR: 86 , resulted on 3/17/2023            Passed - GFR in the past 12 months             Recent Outpatient Visits              2 months ago Encounter for annual health examination    [address], Mae Lao MD    Office Visit    6 months ago Type 2 diabetes mellitus with hyperglycemia, without long-term current use of insulin [facility])    Mae Jimenez MD    Office Visit    7 months ago Soft tissue mass    Nasir Jimenez MD    Office Visit    9 months ago Type 2 diabetes mellitus with hyperglycemia, without long-term current use of insulin [facility])    Mae Jimenez MD    Office Visit    1 year ago Encounter for annual health examination    Mae Jimenez MD Office Visit

## 2023-06-15 ENCOUNTER — TELEPHONE (OUTPATIENT)
Dept: FAMILY MEDICINE CLINIC | Facility: CLINIC | Age: 68
End: 2023-06-15

## 2023-06-15 NOTE — TELEPHONE ENCOUNTER
Spoke to patient and informed her she is due for DM follow up. Patient voiced understanding and agreed to schedule.

## 2023-06-26 ENCOUNTER — HOSPITAL ENCOUNTER (OUTPATIENT)
Dept: MAMMOGRAPHY | Age: 68
Discharge: HOME | End: 2023-06-26
Attending: INTERNAL MEDICINE

## 2023-06-26 DIAGNOSIS — R92.2 INCONCLUSIVE MAMMOGRAM: ICD-10-CM

## 2023-06-26 DIAGNOSIS — Z12.31 ENCOUNTER FOR SCREENING MAMMOGRAM FOR MALIGNANT NEOPLASM OF BREAST: ICD-10-CM

## 2023-06-26 PROCEDURE — 76641 ULTRASOUND BREAST COMPLETE: CPT

## 2023-07-20 ENCOUNTER — HOSPITAL ENCOUNTER (OUTPATIENT)
Dept: BONE DENSITY | Age: 68
Discharge: HOME | End: 2023-07-20
Attending: FAMILY MEDICINE
Payer: MEDICARE

## 2023-07-20 DIAGNOSIS — Z78.0 POST-MENOPAUSAL: ICD-10-CM

## 2023-07-20 PROCEDURE — 77080 DXA BONE DENSITY AXIAL: CPT | Performed by: FAMILY MEDICINE

## 2023-08-08 ENCOUNTER — MED REC SCAN ONLY (OUTPATIENT)
Dept: FAMILY MEDICINE CLINIC | Facility: CLINIC | Age: 68
End: 2023-08-08

## 2023-08-10 ENCOUNTER — OFFICE VISIT (OUTPATIENT)
Dept: FAMILY MEDICINE CLINIC | Facility: CLINIC | Age: 68
End: 2023-08-10

## 2023-08-10 VITALS
DIASTOLIC BLOOD PRESSURE: 78 MMHG | TEMPERATURE: 98 F | WEIGHT: 160 LBS | SYSTOLIC BLOOD PRESSURE: 128 MMHG | BODY MASS INDEX: 28 KG/M2 | HEART RATE: 51 BPM

## 2023-08-10 DIAGNOSIS — H10.13 ALLERGIC CONJUNCTIVITIS OF BOTH EYES: ICD-10-CM

## 2023-08-10 DIAGNOSIS — R10.13 EPIGASTRIC ABDOMINAL PAIN: ICD-10-CM

## 2023-08-10 DIAGNOSIS — E11.65 TYPE 2 DIABETES MELLITUS WITH HYPERGLYCEMIA, WITHOUT LONG-TERM CURRENT USE OF INSULIN (HCC): Primary | ICD-10-CM

## 2023-08-10 DIAGNOSIS — E78.2 MIXED HYPERLIPIDEMIA: ICD-10-CM

## 2023-08-10 DIAGNOSIS — I10 ESSENTIAL HYPERTENSION: ICD-10-CM

## 2023-08-10 LAB
CARTRIDGE LOT#: ABNORMAL NUMERIC
CREAT UR-SCNC: 48 MG/DL
HEMOGLOBIN A1C: 7.6 % (ref 4.3–5.6)
MICROALBUMIN UR-MCNC: <0.5 MG/DL

## 2023-08-10 PROCEDURE — 99214 OFFICE O/P EST MOD 30 MIN: CPT | Performed by: FAMILY MEDICINE

## 2023-08-10 PROCEDURE — 83036 HEMOGLOBIN GLYCOSYLATED A1C: CPT | Performed by: FAMILY MEDICINE

## 2023-08-10 PROCEDURE — 1126F AMNT PAIN NOTED NONE PRSNT: CPT | Performed by: FAMILY MEDICINE

## 2023-08-10 RX ORDER — ATORVASTATIN CALCIUM 20 MG/1
20 TABLET, FILM COATED ORAL DAILY
Qty: 90 TABLET | Refills: 3 | Status: SHIPPED | OUTPATIENT
Start: 2023-08-10

## 2023-08-10 RX ORDER — OLOPATADINE HYDROCHLORIDE 2 MG/ML
1 SOLUTION/ DROPS OPHTHALMIC DAILY
Qty: 2.5 ML | Refills: 3 | Status: SHIPPED | OUTPATIENT
Start: 2023-08-10

## 2023-08-10 RX ORDER — PANTOPRAZOLE SODIUM 40 MG/1
40 TABLET, DELAYED RELEASE ORAL
Qty: 30 TABLET | Refills: 1 | Status: SHIPPED | OUTPATIENT
Start: 2023-08-10

## 2023-08-10 RX ORDER — DULAGLUTIDE 0.75 MG/.5ML
0.75 INJECTION, SOLUTION SUBCUTANEOUS WEEKLY
Qty: 2 ML | Refills: 3 | Status: SHIPPED | OUTPATIENT
Start: 2023-08-10

## 2023-09-11 DIAGNOSIS — N39.41 URGE INCONTINENCE: ICD-10-CM

## 2023-09-11 RX ORDER — OXYBUTYNIN CHLORIDE 5 MG/1
5 TABLET ORAL DAILY PRN
Qty: 90 TABLET | Refills: 3 | Status: SHIPPED | OUTPATIENT
Start: 2023-09-11

## 2023-11-21 ENCOUNTER — OFFICE VISIT (OUTPATIENT)
Dept: FAMILY MEDICINE CLINIC | Facility: CLINIC | Age: 68
End: 2023-11-21

## 2023-11-21 ENCOUNTER — LAB ENCOUNTER (OUTPATIENT)
Dept: LAB | Age: 68
End: 2023-11-21
Attending: NURSE PRACTITIONER
Payer: MEDICARE

## 2023-11-21 VITALS
RESPIRATION RATE: 16 BRPM | OXYGEN SATURATION: 99 % | TEMPERATURE: 98 F | BODY MASS INDEX: 28.67 KG/M2 | SYSTOLIC BLOOD PRESSURE: 128 MMHG | WEIGHT: 161.81 LBS | HEART RATE: 80 BPM | HEIGHT: 63 IN | DIASTOLIC BLOOD PRESSURE: 75 MMHG

## 2023-11-21 DIAGNOSIS — E78.2 MIXED HYPERLIPIDEMIA: ICD-10-CM

## 2023-11-21 DIAGNOSIS — E11.65 TYPE 2 DIABETES MELLITUS WITH HYPERGLYCEMIA, WITHOUT LONG-TERM CURRENT USE OF INSULIN (HCC): ICD-10-CM

## 2023-11-21 DIAGNOSIS — K59.00 CONSTIPATION, UNSPECIFIED CONSTIPATION TYPE: ICD-10-CM

## 2023-11-21 DIAGNOSIS — R14.0 ABDOMINAL BLOATING: ICD-10-CM

## 2023-11-21 DIAGNOSIS — I10 ESSENTIAL HYPERTENSION: ICD-10-CM

## 2023-11-21 DIAGNOSIS — R10.13 EPIGASTRIC ABDOMINAL PAIN: ICD-10-CM

## 2023-11-21 DIAGNOSIS — E11.65 TYPE 2 DIABETES MELLITUS WITH HYPERGLYCEMIA, WITHOUT LONG-TERM CURRENT USE OF INSULIN (HCC): Primary | ICD-10-CM

## 2023-11-21 LAB
ALBUMIN SERPL-MCNC: 4.2 G/DL (ref 3.2–4.8)
ALBUMIN/GLOB SERPL: 1.4 {RATIO} (ref 1–2)
ALP LIVER SERPL-CCNC: 83 U/L
ALT SERPL-CCNC: 17 U/L
ANION GAP SERPL CALC-SCNC: 6 MMOL/L (ref 0–18)
AST SERPL-CCNC: 19 U/L (ref ?–34)
BILIRUB SERPL-MCNC: 0.5 MG/DL (ref 0.2–1.1)
BUN BLD-MCNC: 8 MG/DL (ref 9–23)
BUN/CREAT SERPL: 9.8 (ref 10–20)
CALCIUM BLD-MCNC: 9.6 MG/DL (ref 8.7–10.4)
CHLORIDE SERPL-SCNC: 105 MMOL/L (ref 98–112)
CHOLEST SERPL-MCNC: 137 MG/DL (ref ?–200)
CO2 SERPL-SCNC: 29 MMOL/L (ref 21–32)
CREAT BLD-MCNC: 0.82 MG/DL
CREAT UR-SCNC: 88.2 MG/DL
EGFRCR SERPLBLD CKD-EPI 2021: 78 ML/MIN/1.73M2 (ref 60–?)
EST. AVERAGE GLUCOSE BLD GHB EST-MCNC: 177 MG/DL (ref 68–126)
FASTING PATIENT LIPID ANSWER: YES
FASTING STATUS PATIENT QL REPORTED: YES
GLOBULIN PLAS-MCNC: 3 G/DL (ref 2.8–4.4)
GLUCOSE BLD-MCNC: 91 MG/DL (ref 70–99)
HBA1C MFR BLD: 7.8 % (ref ?–5.7)
HDLC SERPL-MCNC: 45 MG/DL (ref 40–59)
LDLC SERPL CALC-MCNC: 67 MG/DL (ref ?–100)
MICROALBUMIN UR-MCNC: <0.3 MG/DL
NONHDLC SERPL-MCNC: 92 MG/DL (ref ?–130)
OSMOLALITY SERPL CALC.SUM OF ELEC: 288 MOSM/KG (ref 275–295)
POTASSIUM SERPL-SCNC: 4 MMOL/L (ref 3.5–5.1)
PROT SERPL-MCNC: 7.2 G/DL (ref 5.7–8.2)
SODIUM SERPL-SCNC: 140 MMOL/L (ref 136–145)
TRIGL SERPL-MCNC: 144 MG/DL (ref 30–149)
VLDLC SERPL CALC-MCNC: 22 MG/DL (ref 0–30)

## 2023-11-21 PROCEDURE — 80061 LIPID PANEL: CPT

## 2023-11-21 PROCEDURE — 82570 ASSAY OF URINE CREATININE: CPT

## 2023-11-21 PROCEDURE — 82043 UR ALBUMIN QUANTITATIVE: CPT

## 2023-11-21 PROCEDURE — 83036 HEMOGLOBIN GLYCOSYLATED A1C: CPT

## 2023-11-21 PROCEDURE — 36415 COLL VENOUS BLD VENIPUNCTURE: CPT

## 2023-11-21 PROCEDURE — 80053 COMPREHEN METABOLIC PANEL: CPT

## 2023-11-21 RX ORDER — DOCUSATE SODIUM 100 MG/1
100 CAPSULE, LIQUID FILLED ORAL 2 TIMES DAILY PRN
Qty: 60 CAPSULE | Refills: 0 | Status: SHIPPED | OUTPATIENT
Start: 2023-11-21

## 2023-11-22 DIAGNOSIS — E11.65 TYPE 2 DIABETES MELLITUS WITH HYPERGLYCEMIA, WITHOUT LONG-TERM CURRENT USE OF INSULIN (HCC): Primary | ICD-10-CM

## 2023-11-22 RX ORDER — DULAGLUTIDE 0.75 MG/.5ML
0.75 INJECTION, SOLUTION SUBCUTANEOUS WEEKLY
Qty: 2 ML | Refills: 3 | Status: SHIPPED | OUTPATIENT
Start: 2023-11-22 | End: 2023-11-25

## 2023-11-24 ENCOUNTER — TELEPHONE (OUTPATIENT)
Dept: ENDOCRINOLOGY | Facility: HOSPITAL | Age: 68
End: 2023-11-24

## 2023-11-24 DIAGNOSIS — E11.65 TYPE 2 DIABETES MELLITUS WITH HYPERGLYCEMIA, WITHOUT LONG-TERM CURRENT USE OF INSULIN (HCC): Primary | ICD-10-CM

## 2023-11-24 NOTE — TELEPHONE ENCOUNTER
Consent Verification   Assessment completed with: Patient   HIPPA verified? Yes    General: Introduction of Diabetes Navigator services was done. Contact information given to patient.     Lab Results   Component Value Date    A1C 7.8 (H) 11/21/2023    A1C 7.6 (A) 08/10/2023      Medication Adherence: Navigator reviewed current medication. Per patient has been adherent with prescribed medications as directed. No side effects or concerns. Patient states Trulicity was recently prescribed has not started due to cost barrier. Patient out of pocket is $400 for a 30-day supply. Navigator explained the patient assistance program from different foundations. Patient agreed to meet with navigator to apply for open foundations to help with medication coverage. Navigator encourage to call with any questions or concerns. Patient verbalized understanding all questions answered.     Monitoring: Navigator reviewed the importance to check BS daily and maintain a daily log.     Plan: meet with patient

## 2023-11-25 RX ORDER — SEMAGLUTIDE 0.68 MG/ML
0.25 INJECTION, SOLUTION SUBCUTANEOUS WEEKLY
Qty: 1 EACH | Refills: 1 | Status: SHIPPED | OUTPATIENT
Start: 2023-11-25 | End: 2024-01-05

## 2023-11-25 NOTE — TELEPHONE ENCOUNTER
Addended by: Ted Chilel on: 11/17/2022 09:02 AM     Modules accepted: Jayashree New order placed for ozempic.  Thanks!!

## 2023-11-29 ENCOUNTER — HOSPITAL ENCOUNTER (OUTPATIENT)
Dept: ENDOCRINOLOGY | Age: 68
Discharge: HOME | End: 2023-11-29
Attending: FAMILY MEDICINE
Payer: MEDICARE

## 2023-11-29 NOTE — PROGRESS NOTES
General: Met with patient to review the patient assistance application from [employer]. Patient agreed to conditions and sign application. I encourage to call with any questions or concerns. Patient verbalized understanding.      [employer]   Fax: [phone]  Fax confirmation rec'd

## 2023-12-05 ENCOUNTER — TELEPHONE (OUTPATIENT)
Dept: GASTROENTEROLOGY | Facility: CLINIC | Age: 68
End: 2023-12-05

## 2023-12-05 ENCOUNTER — OFFICE VISIT (OUTPATIENT)
Dept: GASTROENTEROLOGY | Facility: CLINIC | Age: 68
End: 2023-12-05

## 2023-12-05 ENCOUNTER — HOSPITAL ENCOUNTER (OUTPATIENT)
Dept: GENERAL RADIOLOGY | Age: 68
Discharge: HOME | End: 2023-12-05
Attending: INTERNAL MEDICINE
Payer: MEDICARE

## 2023-12-05 VITALS
BODY MASS INDEX: 28.59 KG/M2 | HEIGHT: 63 IN | HEART RATE: 60 BPM | WEIGHT: 161.38 LBS | SYSTOLIC BLOOD PRESSURE: 145 MMHG | DIASTOLIC BLOOD PRESSURE: 79 MMHG

## 2023-12-05 DIAGNOSIS — R10.13 EPIGASTRIC ABDOMINAL PAIN: Primary | ICD-10-CM

## 2023-12-05 DIAGNOSIS — R10.9 ABDOMINAL PAIN, UNSPECIFIED ABDOMINAL LOCATION: Primary | ICD-10-CM

## 2023-12-05 DIAGNOSIS — K59.00 CONSTIPATION, UNSPECIFIED CONSTIPATION TYPE: ICD-10-CM

## 2023-12-05 PROCEDURE — 99204 OFFICE O/P NEW MOD 45 MIN: CPT | Performed by: INTERNAL MEDICINE

## 2023-12-05 PROCEDURE — 1126F AMNT PAIN NOTED NONE PRSNT: CPT | Performed by: INTERNAL MEDICINE

## 2023-12-05 PROCEDURE — 74018 RADEX ABDOMEN 1 VIEW: CPT | Performed by: INTERNAL MEDICINE

## 2023-12-05 NOTE — PATIENT INSTRUCTIONS
Get an x-ray of your abdomen and complete your h pylori breath test. Please let me know when this has resulted. 1. Schedule an upper endoscopy (EGD) with [facility] [Diagnosis: abdominal pain]    2. Do not eat or drink after midnight the night before your procedure. 3. Medication adjustments:       A. SEVEN DAYS BEFORE endoscopy: HOLD semaglutide (ozempic)     B. DAY BEFORE endoscopy: HOLD metformin, glipizide      C. DAY of endoscopy: HOLD metformin, glipizide      D. Continue ALL other medications as usual.    4. If you start any NEW medication after your visit today, please notify us. Certain medications will need to be held before the procedure, or the procedure cannot be performed. 5. You will need a ride home from your procedure since you are receiving sedation. Please ensure you will have an available ride home or the procedure cannot be performed.

## 2023-12-05 NOTE — TELEPHONE ENCOUNTER
Scheduled for:  EGD 23663  Provider Name:  Dr. Soco Coker   Date:  1/2/24  Location:[facility]  Sedation:  MAC  Time:  0830 Am (Patient is aware arrival time is at 0730 Am )  Prep:  Egd -Npo after midnight   Meds/Allergies Reconciled?:  Physician Reviewed   Diagnosis with codes:  Abdominal pain R10.9  Was patient informed to call insurance with codes (Y/N):  Yes  Referral sent?:  Referral was sent at the time of electronic surgical scheduling. [address] [place] [address] or [facility] notified?:  I sent an electronic request to Endo Scheduling and received a confirmation today. Medication Orders:  Pt is aware to NOT take iron pills, herbal meds and diet supplements for 7 days before exam. Also to NOT take any form of alcohol, recreational drugs and any forms of ED meds 24 hours before exam.   Medication adjustments:        A. SEVEN DAYS BEFORE endoscopy: HOLD semaglutide (ozempic) stop on 12/26/2023     B. DAY BEFORE endoscopy: HOLD metformin, glipizide - hold 1/1/2024     C. DAY of endoscopy: HOLD metformin, glipizide - hold 1/2/2024     D. Continue ALL other medications as usual.  Misc Orders:       Further instructions given by staff:  I provide prep instructions to patient at the time of the appointment and reviewed date, time and location, patient verbalized that she understood and is aware to call if she has any questions. Patient was informed about the new cancellation policy for his/her procedure. Patient was also given a copy of the cancellation policy at the time of the appointment and verbalized understanding.

## 2023-12-08 NOTE — PROGRESS NOTES
Called pt with . She did not answer, VM left. KUB with mild-mod stool burden. Start metamucil 1 scoop daily.

## 2023-12-15 ENCOUNTER — TELEPHONE (OUTPATIENT)
Dept: HEMATOLOGY/ONCOLOGY | Age: 68
End: 2023-12-15

## 2023-12-20 ENCOUNTER — NURSE ONLY (OUTPATIENT)
Dept: LAB | Facility: REFERENCE LAB | Age: 68
End: 2023-12-20
Attending: NURSE PRACTITIONER
Payer: MEDICARE

## 2023-12-20 DIAGNOSIS — R14.0 ABDOMINAL BLOATING: ICD-10-CM

## 2023-12-20 DIAGNOSIS — R10.13 EPIGASTRIC ABDOMINAL PAIN: ICD-10-CM

## 2023-12-20 PROCEDURE — 83013 H PYLORI (C-13) BREATH: CPT

## 2023-12-21 LAB — H PYLORI BREATH TEST: NEGATIVE

## 2023-12-26 NOTE — PAT NURSING NOTE
Patient instructed to hold Ozempic seven days prior to procedure per the Pre-surgical testing policy.

## 2023-12-29 ENCOUNTER — TELEPHONE (OUTPATIENT)
Dept: FAMILY MEDICINE CLINIC | Facility: CLINIC | Age: 68
End: 2023-12-29

## 2023-12-29 ENCOUNTER — APPOINTMENT (OUTPATIENT)
Dept: RADIATION ONCOLOGY | Age: 68
End: 2023-12-29

## 2023-12-29 NOTE — TELEPHONE ENCOUNTER
Dr Feliz, please see Dr Edwards's note from 12/05/2023.  OK to have patient hold Ozempic for 7 days prior?      3. Medication adjustments:        A. SEVEN DAYS BEFORE endoscopy: HOLD semaglutide (ozempic)     B. DAY BEFORE endoscopy: HOLD metformin, glipizide      C. DAY of endoscopy: HOLD metformin, glipizide      D. Continue ALL other medications as usual.     4. If you start any NEW medication after your visit today, please notify us. Certain medications will need to be held before the procedure, or the procedure cannot be performed.

## 2023-12-29 NOTE — TELEPHONE ENCOUNTER
Patient requesting a call back to go over the OZEMPIC medication and is concerned because she has an endoscopy on 1/2/24 and was advised to stop this medication days before.      Please advise.

## 2024-01-02 ENCOUNTER — HOSPITAL ENCOUNTER (OUTPATIENT)
Age: 69
Setting detail: HOSPITAL OUTPATIENT SURGERY
Discharge: HOME | End: 2024-01-02
Attending: INTERNAL MEDICINE | Admitting: INTERNAL MEDICINE
Payer: MEDICARE

## 2024-01-02 ENCOUNTER — ANESTHESIA (OUTPATIENT)
Dept: ENDOSCOPY | Age: 69
End: 2024-01-02
Payer: MEDICARE

## 2024-01-02 ENCOUNTER — ANESTHESIA EVENT (OUTPATIENT)
Dept: ENDOSCOPY | Age: 69
End: 2024-01-02
Payer: MEDICARE

## 2024-01-02 DIAGNOSIS — R10.9 ABDOMINAL PAIN, UNSPECIFIED ABDOMINAL LOCATION: ICD-10-CM

## 2024-01-02 LAB — GLUCOSE BLDC GLUCOMTR-MCNC: 172 MG/DL (ref 70–99)

## 2024-01-02 PROCEDURE — 43239 EGD BIOPSY SINGLE/MULTIPLE: CPT | Performed by: INTERNAL MEDICINE

## 2024-01-02 PROCEDURE — 88305 TISSUE EXAM BY PATHOLOGIST: CPT | Performed by: INTERNAL MEDICINE

## 2024-01-02 PROCEDURE — 88342 IMHCHEM/IMCYTCHM 1ST ANTB: CPT | Performed by: INTERNAL MEDICINE

## 2024-01-02 PROCEDURE — 82962 GLUCOSE BLOOD TEST: CPT

## 2024-01-02 PROCEDURE — 88312 SPECIAL STAINS GROUP 1: CPT | Performed by: INTERNAL MEDICINE

## 2024-01-02 PROCEDURE — 99070 SPECIAL SUPPLIES PHYS/QHP: CPT | Performed by: INTERNAL MEDICINE

## 2024-01-02 RX ORDER — SODIUM CHLORIDE, SODIUM LACTATE, POTASSIUM CHLORIDE, CALCIUM CHLORIDE 600; 310; 30; 20 MG/100ML; MG/100ML; MG/100ML; MG/100ML
INJECTION, SOLUTION INTRAVENOUS CONTINUOUS
OUTPATIENT
Start: 2024-01-02

## 2024-01-02 RX ORDER — LIDOCAINE HYDROCHLORIDE 10 MG/ML
INJECTION, SOLUTION EPIDURAL; INFILTRATION; INTRACAUDAL; PERINEURAL AS NEEDED
Status: DISCONTINUED | OUTPATIENT
Start: 2024-01-02 | End: 2024-01-02 | Stop reason: SURG

## 2024-01-02 RX ORDER — NALOXONE HYDROCHLORIDE 0.4 MG/ML
0.08 INJECTION, SOLUTION INTRAMUSCULAR; INTRAVENOUS; SUBCUTANEOUS ONCE AS NEEDED
OUTPATIENT
Start: 2024-01-02 | End: 2024-01-02

## 2024-01-02 RX ORDER — SODIUM CHLORIDE, SODIUM LACTATE, POTASSIUM CHLORIDE, CALCIUM CHLORIDE 600; 310; 30; 20 MG/100ML; MG/100ML; MG/100ML; MG/100ML
INJECTION, SOLUTION INTRAVENOUS CONTINUOUS
Status: DISCONTINUED | OUTPATIENT
Start: 2024-01-02 | End: 2024-01-02

## 2024-01-02 RX ORDER — ONDANSETRON 2 MG/ML
INJECTION INTRAMUSCULAR; INTRAVENOUS AS NEEDED
Status: DISCONTINUED | OUTPATIENT
Start: 2024-01-02 | End: 2024-01-02 | Stop reason: SURG

## 2024-01-02 RX ADMIN — SODIUM CHLORIDE, SODIUM LACTATE, POTASSIUM CHLORIDE, CALCIUM CHLORIDE: 600; 310; 30; 20 INJECTION, SOLUTION INTRAVENOUS at 08:32:00

## 2024-01-02 RX ADMIN — LIDOCAINE HYDROCHLORIDE 50 MG: 10 INJECTION, SOLUTION EPIDURAL; INFILTRATION; INTRACAUDAL; PERINEURAL at 08:32:00

## 2024-01-02 RX ADMIN — ONDANSETRON 4 MG: 2 INJECTION INTRAMUSCULAR; INTRAVENOUS at 08:32:00

## 2024-01-02 NOTE — ANESTHESIA POSTPROCEDURE EVALUATION
Patient: Alena Mathur    Procedure Summary       Date: 01/02/24 Room / Location: [facility] ENDOSCOPY 01 / [facility] ENDO    Anesthesia Start: 0830 Anesthesia Stop:     Procedure: ESOPHAGOGASTRODUODENOSCOPY with biopsy Diagnosis:       Abdominal pain, unspecified abdominal location      (Gastric nodule gastric erythema)    Surgeons: Gay Edwards MD Anesthesiologist: Roya Hi MD    Anesthesia Type: general ASA Status: 2            Anesthesia Type: general    Vitals Value Taken Time   BP 96/55 01/02/24 0854   Temp 97.4 °F (36.3 °C) 01/02/24 0854   Pulse 58 01/02/24 0855   Resp 14 01/02/24 0855   SpO2 94 % 01/02/24 0855   Vitals shown include unfiled device data.    [facility] AN Post Evaluation:   Patient Evaluated in PACU  Patient Participation: complete - patient participated  Level of Consciousness: awake  Pain Management: adequate  Airway Patency:patent  Dental exam unchanged from preop  Yes    Cardiovascular Status: acceptable  Respiratory Status: acceptable  Postoperative Hydration acceptable      ROYA HI MD  1/2/2024 8:55 AM

## 2024-01-02 NOTE — TELEPHONE ENCOUNTER
Patient contacted in Serbian and made aware of Dr. Feliz's recommendation to hold. Patient verbalized understanding. No further questions or concerns at this time.

## 2024-01-02 NOTE — DISCHARGE INSTRUCTIONS
Home Care Instructions for Gastroscopy with Sedation    Diet:  - Resume your regular diet as tolerated unless otherwise instructed.  - Start with light meals to minimize bloating.  - Do not drink alcohol today.    Medication:  - If you have questions about resuming your normal medications, please contact your Primary Care Physician.    Activities:  - Take it easy today. Do not return to work today.  - Do not drive today.  - Do not operate any machinery today (including kitchen equipment).    Gastroscopy:  - You may have a sore throat for 2-3 days following the exam. This is normal. Gargling with warm salt water (1/2 tsp salt to 1 glass warm water) or using throat lozenges will help.  - If you experience any sharp pain in your neck, abdomen or chest, vomiting of blood, oral temperature over 100 degrees Fahrenheit, light-headedness or dizziness, or any other problems, contact your doctor.    **If unable to reach your doctor, please go to the [facility] Emergency Room**    - Your referring physician will receive a full report of your examination.  - If you do not hear from your doctor's office within two weeks of your biopsy, please call them for your results.    You may be able to see your laboratory results in [employer] between 4 and 7 business days.  In some cases, your physician may not have viewed the results before they are released to [employer].  If you have questions regarding your results contact the physician who ordered the test/exam by phone or via [employer] by choosing \"Ask a Medical Question.\"

## 2024-01-02 NOTE — H&P
History & Physical Examination    Patient Name: Alena Mathur  MRN: [id number]  CSN: [id number]  YOB: 1955    Diagnosis: Abdominal pain, EGD today    Medications Prior to Admission   Medication Sig Dispense Refill Last Dose    semaglutide (OZEMPIC, 0.25 OR 0.5 MG/DOSE,) 2 MG/3ML Subcutaneous Solution Pen-injector Inject 0.25 mg into the skin once a week. 1 each 1 12/20/2023    docusate sodium 100 MG Oral Cap Take 1 capsule (100 mg total) by mouth 2 (two) times daily as needed for constipation. 60 capsule 0 1/1/2024    oxybutynin 5 MG Oral Tab Take 1 tablet (5 mg total) by mouth daily as needed. 90 tablet 3 1/1/2024    MAGNESIUM MALATE OR Take by mouth.   12/25/2023    atorvastatin 20 MG Oral Tab Take 1 tablet (20 mg total) by mouth daily. 90 tablet 3 12/31/2023    lisinopril-hydroCHLOROthiazide 20-12.5 MG Oral Tab Take 1 tablet by mouth daily. 90 tablet 3 1/1/2024    pantoprazole 40 MG Oral Tab EC Take 1 tablet (40 mg total) by mouth before breakfast. (Patient not taking: Reported on 12/5/2023) 90 tablet 3     Olopatadine HCl 0.2 % Ophthalmic Solution Apply 1 drop to eye daily. (Patient not taking: Reported on 12/5/2023) 2.5 mL 3     metFORMIN 500 MG Oral Tab Take 1 tablet (500 mg total) by mouth 2 (two) times daily with meals. (Patient not taking: Reported on 12/5/2023) 180 tablet 3     glipiZIDE 5 MG Oral Tab Take 1 tablet (5 mg total) by mouth every morning before breakfast. 90 tablet 3     Insulin Pen Needle (PEN NEEDLES) 32G X 4 MM Does not apply Misc 1 each every 7 days. 30 each 0     prednisoLONE 1 % Ophthalmic Suspension INSTILL 1 DROP INTO BOTH EYES 3 TIMES A DAY FOR 2 DAYS (Patient not taking: Reported on 12/5/2023)        Current Facility-Administered Medications   Medication Dose Route Frequency    lactated ringers infusion   Intravenous Continuous       Allergies: No Known Allergies    Past Medical History:   Diagnosis Date    Breast cancer (HCC)     Diabetes (HCC)     Essential  hypertension     High blood pressure     High cholesterol     Hyperlipidemia     PONV (postoperative nausea and vomiting)      Past Surgical History:   Procedure Laterality Date    COLONOSCOPY  02/2020    EYE SURGERY Left     FOOT SURGERY Left     HYSTERECTOMY       Family History   Problem Relation Age of Onset    Cancer Father         Lung cancer    Dementia Mother      Social History     Tobacco Use    Smoking status: Never     Passive exposure: Never    Smokeless tobacco: Never   Substance Use Topics    Alcohol use: Not Currently     Comment: social       SYSTEM Check if Review is Normal Check if Physical Exam is Normal If not normal, please explain:   HEENT [X ] [ X]    NECK  [X ] [ X]    HEART [X ] [ X]    LUNGS [X ] [ X]    ABDOMEN [X ] [ X]    EXTREMITIES [X ] [ X]    OTHER        I have discussed the risks and benefits and alternatives of the procedure with the patient/family.  They understand and agree to proceed with plan of care.   I have reviewed the History and Physical done within the last 30 days.  Any changes noted above.  Gay Edwards MD  [facility]

## 2024-01-02 NOTE — ANESTHESIA PREPROCEDURE EVALUATION
Anesthesia PreOp Note    HPI:     Alena Mathur is a 68 year old female who presents for preoperative consultation requested by: Gay Edwards MD    Date of Surgery: 1/2/2024    Procedure(s):  ESOPHAGOGASTRODUODENOSCOPY  Indication: Abdominal pain, unspecified abdominal location    Relevant Problems   No relevant active problems       NPO:  Last Liquid Consumption Date: 01/01/24  Last Liquid Consumption Time: 2000  Last Solid Consumption Date: 01/01/24  Last Solid Consumption Time: 1700  Last Liquid Consumption Date: 01/01/24          History Review:  Patient Active Problem List    Diagnosis Date Noted    Breast cancer (HCC)     Dense breast tissue on mammogram 03/09/2023    Diabetes (HCC)     Essential hypertension     Hyperlipidemia        Past Medical History:   Diagnosis Date    Breast cancer (HCC)     Diabetes (HCC)     Essential hypertension     High blood pressure     High cholesterol     Hyperlipidemia     PONV (postoperative nausea and vomiting)        Past Surgical History:   Procedure Laterality Date    COLONOSCOPY  02/2020    EYE SURGERY Left     FOOT SURGERY Left     HYSTERECTOMY         Medications Prior to Admission   Medication Sig Dispense Refill Last Dose    semaglutide (OZEMPIC, 0.25 OR 0.5 MG/DOSE,) 2 MG/3ML Subcutaneous Solution Pen-injector Inject 0.25 mg into the skin once a week. 1 each 1 12/20/2023    docusate sodium 100 MG Oral Cap Take 1 capsule (100 mg total) by mouth 2 (two) times daily as needed for constipation. 60 capsule 0 1/1/2024    oxybutynin 5 MG Oral Tab Take 1 tablet (5 mg total) by mouth daily as needed. 90 tablet 3 1/1/2024    MAGNESIUM MALATE OR Take by mouth.   12/25/2023    atorvastatin 20 MG Oral Tab Take 1 tablet (20 mg total) by mouth daily. 90 tablet 3 12/31/2023    lisinopril-hydroCHLOROthiazide 20-12.5 MG Oral Tab Take 1 tablet by mouth daily. 90 tablet 3 1/1/2024    pantoprazole 40 MG Oral Tab EC Take 1 tablet (40 mg total) by mouth before breakfast.  (Patient not taking: Reported on 12/5/2023) 90 tablet 3     Olopatadine HCl 0.2 % Ophthalmic Solution Apply 1 drop to eye daily. (Patient not taking: Reported on 12/5/2023) 2.5 mL 3     metFORMIN 500 MG Oral Tab Take 1 tablet (500 mg total) by mouth 2 (two) times daily with meals. (Patient not taking: Reported on 12/5/2023) 180 tablet 3     glipiZIDE 5 MG Oral Tab Take 1 tablet (5 mg total) by mouth every morning before breakfast. 90 tablet 3     Insulin Pen Needle (PEN NEEDLES) 32G X 4 MM Does not apply Misc 1 each every 7 days. 30 each 0     prednisoLONE 1 % Ophthalmic Suspension INSTILL 1 DROP INTO BOTH EYES 3 TIMES A DAY FOR 2 DAYS (Patient not taking: Reported on 12/5/2023)        Current Facility-Administered Medications Ordered in Epic   Medication Dose Route Frequency Provider Last Rate Last Admin    lactated ringers infusion   Intravenous Continuous Gay Edwards MD         No current [facility]-ordered outpatient medications on file.       No Known Allergies    Family History   Problem Relation Age of Onset    Cancer Father         Lung cancer    Dementia Mother      Social History     Socioeconomic History    Marital status:    Tobacco Use    Smoking status: Never     Passive exposure: Never    Smokeless tobacco: Never   Vaping Use    Vaping Use: Never used   Substance and Sexual Activity    Alcohol use: Not Currently     Comment: social    Drug use: Never    Sexual activity: Not Currently   Other Topics Concern     Service No    Blood Transfusions No    Caffeine Concern No    Occupational Exposure No    Hobby Hazards No    Sleep Concern No    Stress Concern No    Weight Concern No    Special Diet No    Back Care No    Exercise No    Bike Helmet No    Seat Belt No    Self-Exams No       Available pre-op labs reviewed.     Lab Results   Component Value Date     11/21/2023    K 4.0 11/21/2023     11/21/2023    CO2 29.0 11/21/2023    BUN 8 (L) 11/21/2023    CREATSERUM  0.82 11/21/2023    GLU 91 11/21/2023    CA 9.6 11/21/2023          Vital Signs:  Body mass index is 28.52 kg/m².   height is 1.6 m (5' 3\") and weight is 73 kg (161 lb). Her blood pressure is 122/69 and her pulse is 56. Her respiration is 16 and oxygen saturation is 100%.   Vitals:    12/26/23 1732 01/02/24 0748 01/02/24 0801   BP:   122/69   Pulse:   56   Resp:   16   SpO2:   100%   Weight: 73 kg (161 lb) 73 kg (161 lb)    Height: 1.6 m (5' 3\") 1.6 m (5' 3\")         Anesthesia Evaluation     Patient summary reviewed and Nursing notes reviewed    Airway   Mallampati: III  TM distance: >3 FB  Neck ROM: full  Dental - Dentition appears grossly intact     Pulmonary - negative ROS and normal exam   Cardiovascular - normal exam  (+) hypertension    Neuro/Psych - negative ROS     GI/Hepatic/Renal - negative ROS     Endo/Other    (+) diabetes mellitus type 2  Abdominal   (+) obese                 Anesthesia Plan:   ASA:  2  Plan:   General  Informed Consent Plan and Risks Discussed With:  Patient  Discussed plan with:  Surgeon      I have informed Alena Mathur and/or legal guardian or family member of the nature of the anesthetic plan, benefits, risks including possible dental damage if relevant, major complications, and any alternative forms of anesthetic management.   All of the patient's questions were answered to the best of my ability. The patient desires the anesthetic management as planned.  SUHA AMBRIZ MD  1/2/2024 8:12 AM  Present on Admission:  **None**

## 2024-01-02 NOTE — OPERATIVE REPORT
ESOPHAGOGASTRODUODENOSCOPY (EGD) REPORT    Alena Mathur     1955 Age 68 year old   PCP Kiara Feliz MD Endoscopist Gay Edwards MD       Date of procedure: 24    Procedure: EGD w/ biopsies     Pre-operative diagnosis: epigastric pain     Post-operative diagnosis: gastric erythema and granularity, gastric nodule     Medications: MAC    Complications: none    Procedure:  Informed consent was obtained from the patient after the risks of the procedure were discussed, including but not limited to bleeding, perforation, aspiration, infection, or possibility of a missed lesion. After discussions of the risks/benefits and alternatives to this procedure, as well as the planned sedation, the patient was placed in the left lateral decubitus position and begun on continuous blood pressure pulse oximetry and EKG monitoring and this was maintained throughout the procedure. Once an adequate level of sedation was obtained a bite block was placed. Then the lubricated tip of the [id number] diagnostic video upper endoscope was inserted and advanced using direct visualization into the posterior pharynx and ultimately into the esophagus. The scope was then advanced through the stomach and to the second portion of the duodenum.    Complications: None    Findings:      1. Esophagus: The squamocolumnar junction was noted at 38 cm and appeared regular. The diaphragmatic pinch was noted noted at 38 cm from the incisors. The esophageal mucosa appeared normal. There was no endoscopic findings of esophagitis, stricture or Mathew's esophagus.    2. Stomach: The stomach distended normally. Normal rugal folds were seen. The pylorus was patent. Retroflexion revealed a normal fundus. The gastric mucosa appeared erythematous and granular. There was a small gastric nodule noted in the mid-body along the greater curvature, biopsied. Biopsies were taken with a cold forceps from the antrum, incisura and body for histology.      3. Duodenum: The duodenal mucosa appeared normal in the bulb and 2nd portion of the duodenum.     Impression:  -Esophagus: Normal.   -Stomach: Gastric erythema and granularity. Biopsied. Small mid-body gastric nodule, biopsied.   -Duodenum: Normal.     Recommend:  1. Await pathology.   2. Take pantoprazole twice a day. This is best taken 30-60 minutes before breakfast and dinner.   3. Continue taking metamucil 1 scoop daily.     >>>If tissue was sampled/removed and you have not received your pathology results either by phone or letter within 2 weeks, please call our office at [phone].    Specimens:  Gastric biopsies     Blood loss: <1 ml

## 2024-01-03 VITALS
RESPIRATION RATE: 16 BRPM | DIASTOLIC BLOOD PRESSURE: 67 MMHG | SYSTOLIC BLOOD PRESSURE: 115 MMHG | WEIGHT: 161 LBS | HEIGHT: 63 IN | BODY MASS INDEX: 28.53 KG/M2 | TEMPERATURE: 97 F | OXYGEN SATURATION: 99 % | HEART RATE: 62 BPM

## 2024-01-05 ENCOUNTER — OFFICE VISIT (OUTPATIENT)
Dept: FAMILY MEDICINE CLINIC | Facility: CLINIC | Age: 69
End: 2024-01-05

## 2024-01-05 VITALS
HEART RATE: 80 BPM | SYSTOLIC BLOOD PRESSURE: 127 MMHG | HEIGHT: 63 IN | WEIGHT: 161 LBS | DIASTOLIC BLOOD PRESSURE: 82 MMHG | BODY MASS INDEX: 28.53 KG/M2

## 2024-01-05 DIAGNOSIS — E11.65 TYPE 2 DIABETES MELLITUS WITH HYPERGLYCEMIA, WITHOUT LONG-TERM CURRENT USE OF INSULIN (HCC): Primary | ICD-10-CM

## 2024-01-05 LAB
CARTRIDGE LOT#: ABNORMAL NUMERIC
HEMOGLOBIN A1C: 9.1 % (ref 4.3–5.6)

## 2024-01-05 PROCEDURE — 1126F AMNT PAIN NOTED NONE PRSNT: CPT | Performed by: NURSE PRACTITIONER

## 2024-01-05 PROCEDURE — 83036 HEMOGLOBIN GLYCOSYLATED A1C: CPT | Performed by: NURSE PRACTITIONER

## 2024-01-05 PROCEDURE — 99214 OFFICE O/P EST MOD 30 MIN: CPT | Performed by: NURSE PRACTITIONER

## 2024-01-05 NOTE — PROGRESS NOTES
HPI    Patient presents for follow up for diabetes.  Needs a refill on ozempic.  States that diet and exercise are fair.      Review of Systems   All other systems reviewed and are negative.       Vitals:    01/05/24 0722   BP: 127/82   Pulse: 80   Weight: 161 lb (73 kg)   Height: 5' 3\" (1.6 m)     Body mass index is 28.52 kg/m².    Health Maintenance   Topic Date Due    COVID-19 Vaccine (4 - 2023-24 season) 09/01/2023    Influenza Vaccine (1) 10/01/2023    Annual Depression Screening  01/01/2024    Diabetes Care Foot Exam (Annual)  01/01/2024    HTN: BP Follow-Up  01/05/2024    Annual Physical  03/17/2024    Mammogram  03/17/2024    Diabetes Care A1C  05/21/2024    Diabetes Care Dilated Eye Exam  07/05/2024    Diabetes Care: GFR  11/21/2024    Diabetes Care: Microalb/Creat Ratio  11/21/2024    Colorectal Cancer Screening  02/01/2030    DEXA Scan  Completed    Fall Risk Screening (Annual)  Completed    Pneumococcal Vaccine: 65+ Years  Completed    Zoster Vaccines  Completed       No LMP recorded. Patient has had a hysterectomy.    Past Medical History:   Diagnosis Date    Breast cancer (HCC)     Diabetes (HCC)     Essential hypertension     High blood pressure     High cholesterol     Hyperlipidemia     PONV (postoperative nausea and vomiting)        .  Past Surgical History:   Procedure Laterality Date    Colonoscopy  02/2020    Eye surgery Left     Foot surgery Left     Hysterectomy         Family History   Problem Relation Age of Onset    Cancer Father         Lung cancer    Dementia Mother        Social History     Socioeconomic History    Marital status:      Spouse name: Not on file    Number of children: Not on file    Years of education: Not on file    Highest education level: Not on file   Occupational History    Not on file   Tobacco Use    Smoking status: Never     Passive exposure: Never    Smokeless tobacco: Never   Vaping Use    Vaping Use: Never used   Substance and Sexual Activity    Alcohol  use: Not Currently     Comment: social    Drug use: Never    Sexual activity: Not Currently   Other Topics Concern     Service No    Blood Transfusions No    Caffeine Concern No    Occupational Exposure No    Hobby Hazards No    Sleep Concern No    Stress Concern No    Weight Concern No    Special Diet No    Back Care No    Exercise No    Bike Helmet No    Seat Belt No    Self-Exams No   Social History Narrative    Not on file     Social Determinants of Health     Financial Resource Strain: Not on file   Food Insecurity: Not on file   Transportation Needs: Not on file   Physical Activity: Not on file   Stress: Not on file   Social Connections: Not on file   Housing Stability: Not on file       Current Outpatient Medications   Medication Sig Dispense Refill    semaglutide 4 MG/3ML Subcutaneous Solution Pen-injector Inject 1 mg into the skin once a week. 1 each 5    docusate sodium 100 MG Oral Cap Take 1 capsule (100 mg total) by mouth 2 (two) times daily as needed for constipation. 60 capsule 0    oxybutynin 5 MG Oral Tab Take 1 tablet (5 mg total) by mouth daily as needed. 90 tablet 3    pantoprazole 40 MG Oral Tab EC Take 1 tablet (40 mg total) by mouth before breakfast. 90 tablet 3    MAGNESIUM MALATE OR Take by mouth.      atorvastatin 20 MG Oral Tab Take 1 tablet (20 mg total) by mouth daily. 90 tablet 3    Olopatadine HCl 0.2 % Ophthalmic Solution Apply 1 drop to eye daily. 2.5 mL 3    lisinopril-hydroCHLOROthiazide 20-12.5 MG Oral Tab Take 1 tablet by mouth daily. 90 tablet 3    metFORMIN 500 MG Oral Tab Take 1 tablet (500 mg total) by mouth 2 (two) times daily with meals. 180 tablet 3    glipiZIDE 5 MG Oral Tab Take 1 tablet (5 mg total) by mouth every morning before breakfast. 90 tablet 3    Insulin Pen Needle (PEN NEEDLES) 32G X 4 MM Does not apply Misc 1 each every 7 days. 30 each 0    prednisoLONE 1 % Ophthalmic Suspension          Allergies:  No Known Allergies    Physical Exam  Vitals and  nursing note reviewed.   Constitutional:       General: She is not in acute distress.     Appearance: Normal appearance. She is not ill-appearing.   HENT:      Head: Normocephalic and atraumatic.   Cardiovascular:      Rate and Rhythm: Normal rate and regular rhythm.      Pulses: Normal pulses.      Heart sounds: Normal heart sounds. No murmur heard.  Pulmonary:      Effort: Pulmonary effort is normal. No respiratory distress.      Breath sounds: Normal breath sounds. No stridor. No wheezing, rhonchi or rales.   Chest:      Chest wall: No tenderness.   Feet:      Comments: Bilateral barefoot skin diabetic exam is normal, visualized feet and the appearance is normal.  Bilateral monofilament/sensation of both feet is normal.  Pulsation pedal pulse exam of both lower legs/feet is normal as well.        Skin:     General: Skin is warm and dry.   Neurological:      Mental Status: She is alert and oriented to person, place, and time.   Psychiatric:         Mood and Affect: Mood normal.         Behavior: Behavior normal.         Thought Content: Thought content normal.         Judgment: Judgment normal.          Assessment and Plan:  Problem List Items Addressed This Visit          HCC Problems    Diabetes (HCC) - Primary    Relevant Medications    semaglutide 4 MG/3ML Subcutaneous Solution Pen-injector    Other Relevant Orders    POC Glycohemoglobin [11280] (Completed)      POC A1c 9.1.  Encouraged healthy diet and regular exercise.  Increase ozempic to 1 mg weekly.  Follow up in 3 months.         Discussed plan of care with patient and patient is in agreement.  All questions answered. Patient to call with questions or concerns.    Encouraged to sign up for My Chart if not already registered.

## 2024-01-08 ENCOUNTER — TELEPHONE (OUTPATIENT)
Facility: CLINIC | Age: 69
End: 2024-01-08

## 2024-01-08 NOTE — TELEPHONE ENCOUNTER
Recall EGD in 3 years per Dr Edwards.    EGD done 1/2/2024    Language line interpretor used:  Betsy #[id number]    LMTCB - need to make follow up appointment

## 2024-01-08 NOTE — PROGRESS NOTES
Discussed biopsy results with pt via [place] telephone . Recommended BID PPI for now and repeat EGD in 3 years. Can we please schedule her for follow-up with me in 3 months? Thanks!

## 2024-01-08 NOTE — TELEPHONE ENCOUNTER
----- Message from Gay Edwards MD sent at 1/8/2024  5:13 PM CST -----  Discussed biopsy results with pt via [place] telephone . Recommended BID PPI for now and repeat EGD in 3 years. Can we please schedule her for follow-up with me in 3 months? Thanks!

## 2024-01-25 ENCOUNTER — TELEPHONE (OUTPATIENT)
Dept: ENDOCRINOLOGY | Facility: HOSPITAL | Age: 69
End: 2024-01-25

## 2024-02-05 ENCOUNTER — TELEPHONE (OUTPATIENT)
Dept: ENDOCRINOLOGY | Facility: HOSPITAL | Age: 69
End: 2024-02-05

## 2024-02-05 NOTE — TELEPHONE ENCOUNTER
Consent Verification   Assessment completed with: Patient   HIPPA verified? Yes    Lab Results   Component Value Date    A1C 9.1 (A) 01/05/2024    A1C 7.8 (H) 11/21/2023      Condition Update: Navigator reviewed current medications. Per patient has been adherent with prescribed medications as directed. No side effects or concerns. Patient states Ozempic was filled with a $608 co-pay. Navigator explained she is now enrolled in the patient assistance program from [employer] to receive Ozempic at no cost. Navigator reviewed indications and side effects on Ozempic. Navigator encourage to call with any questions or concerns, patient verbalized understanding.       Navigator contacted [facility] to obtain status on the expected delivery date on Ozempic. Per representative medication was process on 1/25/24 and will be deliver within 10-14 business days.     Follow-up appointments:  PCP: 03/05/204 Dr. Feliz    Plan: PAP Follow up

## 2024-03-05 ENCOUNTER — LAB ENCOUNTER (OUTPATIENT)
Dept: LAB | Age: 69
End: 2024-03-05
Attending: FAMILY MEDICINE
Payer: MEDICARE

## 2024-03-05 ENCOUNTER — OFFICE VISIT (OUTPATIENT)
Dept: FAMILY MEDICINE CLINIC | Facility: CLINIC | Age: 69
End: 2024-03-05

## 2024-03-05 VITALS
BODY MASS INDEX: 28 KG/M2 | DIASTOLIC BLOOD PRESSURE: 87 MMHG | HEART RATE: 57 BPM | WEIGHT: 157 LBS | SYSTOLIC BLOOD PRESSURE: 154 MMHG

## 2024-03-05 DIAGNOSIS — E11.65 TYPE 2 DIABETES MELLITUS WITH HYPERGLYCEMIA, WITHOUT LONG-TERM CURRENT USE OF INSULIN (HCC): ICD-10-CM

## 2024-03-05 DIAGNOSIS — E78.2 MIXED HYPERLIPIDEMIA: ICD-10-CM

## 2024-03-05 DIAGNOSIS — C50.912 MALIGNANT NEOPLASM OF LEFT FEMALE BREAST, UNSPECIFIED ESTROGEN RECEPTOR STATUS, UNSPECIFIED SITE OF BREAST (HCC): ICD-10-CM

## 2024-03-05 DIAGNOSIS — I10 ESSENTIAL HYPERTENSION: ICD-10-CM

## 2024-03-05 DIAGNOSIS — R10.13 EPIGASTRIC ABDOMINAL PAIN: ICD-10-CM

## 2024-03-05 DIAGNOSIS — R35.0 URINARY FREQUENCY: ICD-10-CM

## 2024-03-05 DIAGNOSIS — Z00.00 ENCOUNTER FOR ANNUAL HEALTH EXAMINATION: ICD-10-CM

## 2024-03-05 DIAGNOSIS — E11.65 TYPE 2 DIABETES MELLITUS WITH HYPERGLYCEMIA, WITHOUT LONG-TERM CURRENT USE OF INSULIN (HCC): Primary | ICD-10-CM

## 2024-03-05 PROBLEM — R92.30 DENSE BREAST TISSUE ON MAMMOGRAM: Status: RESOLVED | Noted: 2023-03-09 | Resolved: 2024-03-05

## 2024-03-05 LAB
ALBUMIN SERPL-MCNC: 4.3 G/DL (ref 3.2–4.8)
ALBUMIN/GLOB SERPL: 1.4 {RATIO} (ref 1–2)
ALP LIVER SERPL-CCNC: 94 U/L
ALT SERPL-CCNC: 22 U/L
ANION GAP SERPL CALC-SCNC: 7 MMOL/L (ref 0–18)
AST SERPL-CCNC: 22 U/L (ref ?–34)
BASOPHILS # BLD AUTO: 0.04 X10(3) UL (ref 0–0.2)
BASOPHILS NFR BLD AUTO: 0.7 %
BILIRUB SERPL-MCNC: 0.6 MG/DL (ref 0.2–1.1)
BILIRUB UR QL: NEGATIVE
BUN BLD-MCNC: 13 MG/DL (ref 9–23)
BUN/CREAT SERPL: 17.3 (ref 10–20)
CALCIUM BLD-MCNC: 9.5 MG/DL (ref 8.7–10.4)
CHLORIDE SERPL-SCNC: 107 MMOL/L (ref 98–112)
CHOLEST SERPL-MCNC: 189 MG/DL (ref ?–200)
CLARITY UR: CLEAR
CO2 SERPL-SCNC: 27 MMOL/L (ref 21–32)
CREAT BLD-MCNC: 0.75 MG/DL
DEPRECATED RDW RBC AUTO: 43.2 FL (ref 35.1–46.3)
EGFRCR SERPLBLD CKD-EPI 2021: 87 ML/MIN/1.73M2 (ref 60–?)
EOSINOPHIL # BLD AUTO: 0.07 X10(3) UL (ref 0–0.7)
EOSINOPHIL NFR BLD AUTO: 1.3 %
ERYTHROCYTE [DISTWIDTH] IN BLOOD BY AUTOMATED COUNT: 13.1 % (ref 11–15)
EST. AVERAGE GLUCOSE BLD GHB EST-MCNC: 160 MG/DL (ref 68–126)
FASTING PATIENT LIPID ANSWER: YES
FASTING STATUS PATIENT QL REPORTED: YES
GFR SERPLBLD SCHWARTZ-ARVRAT: 87 ML/MIN/{1.73_M2}
GLOBULIN PLAS-MCNC: 3.1 G/DL (ref 2.8–4.4)
GLUCOSE BLD-MCNC: 119 MG/DL (ref 70–99)
GLUCOSE UR-MCNC: NORMAL MG/DL
HBA1C MFR BLD: 7.2 % (ref ?–5.7)
HCT VFR BLD AUTO: 39.3 %
HDLC SERPL-MCNC: 48 MG/DL (ref 40–59)
HGB BLD-MCNC: 13 G/DL
HGB UR QL STRIP.AUTO: NEGATIVE
IMM GRANULOCYTES # BLD AUTO: 0.03 X10(3) UL (ref 0–1)
IMM GRANULOCYTES NFR BLD: 0.6 %
KETONES UR-MCNC: NEGATIVE MG/DL
LDLC SERPL CALC-MCNC: 119 MG/DL (ref ?–100)
LEUKOCYTE ESTERASE UR QL STRIP.AUTO: NEGATIVE
LYMPHOCYTES # BLD AUTO: 1.71 X10(3) UL (ref 1–4)
LYMPHOCYTES NFR BLD AUTO: 32 %
MCH RBC QN AUTO: 29.5 PG (ref 26–34)
MCHC RBC AUTO-ENTMCNC: 33.1 G/DL (ref 31–37)
MCV RBC AUTO: 89.1 FL
MONOCYTES # BLD AUTO: 0.49 X10(3) UL (ref 0.1–1)
MONOCYTES NFR BLD AUTO: 9.2 %
NEUTROPHILS # BLD AUTO: 3.01 X10 (3) UL (ref 1.5–7.7)
NEUTROPHILS # BLD AUTO: 3.01 X10(3) UL (ref 1.5–7.7)
NEUTROPHILS NFR BLD AUTO: 56.2 %
NITRITE UR QL STRIP.AUTO: NEGATIVE
NONHDLC SERPL-MCNC: 141 MG/DL (ref ?–130)
OSMOLALITY SERPL CALC.SUM OF ELEC: 293 MOSM/KG (ref 275–295)
PH UR: 8 [PH] (ref 5–8)
PLATELET # BLD AUTO: 292 10(3)UL (ref 150–450)
POTASSIUM SERPL-SCNC: 4 MMOL/L (ref 3.5–5.1)
PROT SERPL-MCNC: 7.4 G/DL (ref 5.7–8.2)
PROT UR-MCNC: NEGATIVE MG/DL
RBC # BLD AUTO: 4.41 X10(6)UL
SODIUM SERPL-SCNC: 141 MMOL/L (ref 136–145)
SP GR UR STRIP: 1.01 (ref 1–1.03)
TRIGL SERPL-MCNC: 121 MG/DL (ref 30–149)
TSI SER-ACNC: 2.9 MIU/ML (ref 0.55–4.78)
UROBILINOGEN UR STRIP-ACNC: NORMAL
VLDLC SERPL CALC-MCNC: 21 MG/DL (ref 0–30)
WBC # BLD AUTO: 5.4 X10(3) UL (ref 4–11)

## 2024-03-05 PROCEDURE — 84443 ASSAY THYROID STIM HORMONE: CPT

## 2024-03-05 PROCEDURE — 81003 URINALYSIS AUTO W/O SCOPE: CPT

## 2024-03-05 PROCEDURE — 85025 COMPLETE CBC W/AUTO DIFF WBC: CPT

## 2024-03-05 PROCEDURE — 83036 HEMOGLOBIN GLYCOSYLATED A1C: CPT

## 2024-03-05 PROCEDURE — 80061 LIPID PANEL: CPT

## 2024-03-05 PROCEDURE — 80053 COMPREHEN METABOLIC PANEL: CPT

## 2024-03-05 PROCEDURE — 36415 COLL VENOUS BLD VENIPUNCTURE: CPT

## 2024-03-05 RX ORDER — FAMOTIDINE 40 MG/1
40 TABLET, FILM COATED ORAL DAILY
Qty: 30 TABLET | Refills: 0 | Status: SHIPPED | OUTPATIENT
Start: 2024-03-05

## 2024-03-05 NOTE — PROGRESS NOTES
Subjective:   Alena Mathur is a 68 year old female who presents for a Medicare Subsequent Annual Wellness visit (Pt already had Initial Annual Wellness) and scheduled follow up of multiple significant but stable problems.   STARTED OZEMPIC GOING WELL  GETS MAMMOS W HER ONCOLOGIST.     History/Other:   Fall Risk Assessment:   She has been screened for Falls and is low risk.      Cognitive Assessment:   She had a completely normal cognitive assessment - see flowsheet entries     Functional Ability/Status:   Alena Mathur has some abnormal functions as listed below:  She has difficulties Managing Money/Bills based on screening of functional status.She has Vision problems based on screening of functional status. She has problems with Memory based on screening of functional status.       Depression Screening (PHQ-2/PHQ-9): PHQ-2 SCORE: 1  , done 3/5/2024   Feeling down, depressed, or hopeless: 1              Advanced Directives:   She does NOT have a Living Will. [Do you have a living will?: No]  She does NOT have a Power of  for Health Care. [Do you have a healthcare power of ?: No]  Not discussed      Patient Active Problem List   Diagnosis    Diabetes (HCC)    Essential hypertension    Hyperlipidemia    Breast cancer (HCC)     Allergies:  She has No Known Allergies.    Current Medications:  Outpatient Medications Marked as Taking for the 3/5/24 encounter (Office Visit) with Kiara Feliz MD   Medication Sig    famotidine 40 MG Oral Tab Take 1 tablet (40 mg total) by mouth daily.    semaglutide 4 MG/3ML Subcutaneous Solution Pen-injector Inject 1 mg into the skin once a week.    oxybutynin 5 MG Oral Tab Take 1 tablet (5 mg total) by mouth daily as needed.    MAGNESIUM MALATE OR Take by mouth.    lisinopril-hydroCHLOROthiazide 20-12.5 MG Oral Tab Take 1 tablet by mouth daily.       Medical History:  She  has a past medical history of Breast cancer (HCC), Diabetes (HCC), Essential hypertension, High  blood pressure, High cholesterol, Hyperlipidemia, and PONV (postoperative nausea and vomiting).  Surgical History:  She  has a past surgical history that includes Eye surgery (Left); hysterectomy; foot surgery (Left); and colonoscopy (02/2020).   Family History:  Her family history includes Cancer in her father; Dementia in her mother.  Social History:  She  reports that she has never smoked. She has never been exposed to tobacco smoke. She has never used smokeless tobacco. She reports that she does not currently use alcohol. She reports that she does not use drugs.    Tobacco:  She has never smoked tobacco.    CAGE Alcohol Screen:   CAGE screening score of 0 on 3/5/2024, showing low risk of alcohol abuse.      Patient Care Team:  Kiara Feliz MD as PCP - General (Family Medicine)    Review of Systems     Negative except PER HPI     Objective:   Physical Exam  General appearance: alert  HEENT: Normocephalic, without obvious abnormality, atraumatic. PERRL, EOMI, pink conjunctiva.   Neck: supple, symmetrical, trachea midline, no adenopathy, no JVD and thyroid not enlarged,  Lungs: respirations unlabored, clear to auscultation bilaterally  Heart: regular rate and rhythm, S1, S2 normal, no murmur  Abdomen: normoactive bowel sounds x4; soft, non-distended; nontender; no masses  Extremities: extremities normal, atraumatic, no cyanosis or edema; no erythema or tenderness in calves bilaterally  Neurologic: alert, oriented x3    /87 (BP Location: Left arm, Patient Position: Sitting, Cuff Size: large)   Pulse 57   Wt 157 lb (71.2 kg)   BMI 27.81 kg/m²  Estimated body mass index is 27.81 kg/m² as calculated from the following:    Height as of 1/5/24: 5' 3\" (1.6 m).    Weight as of this encounter: 157 lb (71.2 kg).    Medicare Hearing Assessment:   Hearing Screening    Time taken: 3/5/2024  9:08 AM  Screening Method: Questionnaire  I have a problem hearing over the telephone: No I have trouble following the  conversations when two or more people are talking at the same time: No   I have trouble understanding things on the TV: No I have to strain to understand conversations: No   I have to worry about missing the telephone ring or doorbell: No I have trouble hearing conversations in a noisy background such as a crowded room or restaurant: No   I get confused about where sounds come from: No I misunderstand some words in a sentence and need to ask people to repeat themselves: No   I especially have trouble understanding the speech of women and children: No I have trouble understanding the speaker in a large room such as at a meeting or place of Rastafarian: No   Many people I talk to seem to mumble (or don't speak clearly): No People get annoyed because I misunderstand what they say: No   I misunderstand what others are saying and make inappropriate responses: No I avoid social activities because I cannot hear well and fear I will reply improperly: No   Family members and friends have told me they think I may have hearing loss: No               Vision testing not required for subsequent Medicare annual exam    Assessment & Plan:   Alena Mathur is a 68 year old female who presents for a Medicare Assessment.     1. Type 2 diabetes mellitus with hyperglycemia, without long-term current use of insulin (HCC) (Primary)  -     Hemoglobin A1C; Future; Expected date: 03/05/2024  - Stable condition, continue present management.      2. Encounter for annual health examination  -     CBC With Differential With Platelet; Future; Expected date: 03/05/2024  -     Comp Metabolic Panel (14); Future; Expected date: 03/05/2024  -     Hemoglobin A1C; Future; Expected date: 03/05/2024  -     Lipid Panel; Future; Expected date: 03/05/2024  -     TSH W Reflex To Free T4; Future; Expected date: 03/05/2024  -     Urinalysis with Culture Reflex; Future; Expected date: 03/05/202    3. Urinary frequency  -     Urinalysis with Culture Reflex; Future;  Expected date: 03/05/2024    4. Epigastric abdominal pain  - NEW ISSUE   -     Famotidine; Take 1 tablet (40 mg total) by mouth daily.  Dispense: 30 tablet; Refill: 0    5. Malignant neoplasm of left female breast, unspecified estrogen receptor status, unspecified site of breast (HCC)  [facility]/ history of left-sided stage IIa breast cancer no evidence of disease recurrence    6. Mixed hyperlipidemia  - Stable condition, continue present management.      7. Essential hypertension  - Stable condition, continue present management.      The patient indicates understanding of these issues and agrees to the plan.  Reinforced healthy diet, lifestyle, and exercise.      No follow-ups on file.     Kiara Feliz MD, 3/5/2024     Supplementary Documentation:   General Health:  In the past six months, have you lost more than 10 pounds without trying?: 2 - No  Has your appetite been poor?: Yes  Type of Diet: Balanced  How does the patient maintain a good energy level?: Daily Walks  How would you describe your daily physical activity?: Moderate  How would you describe your current health state?: Poor  How do you maintain positive mental well-being?: Visiting Family  On a scale of 0 to 10, with 0 being no pain and 10 being severe pain, what is your pain level?: 4 - (Moderate)  In the past six months, have you experienced urine leakage?: 1-Yes  At any time do you feel concerned for the safety/well-being of yourself and/or your children, in your home or elsewhere?: No  Have you had any immunizations at another office such as Influenza, Hepatitis B, Tetanus, or Pneumococcal?: No       Alena Mathur's SCREENING SCHEDULE   Tests on this list are recommended by your physician but may not be covered, or covered at this frequency, by your insurer.   Please check with your insurance carrier before scheduling to verify coverage.   PREVENTATIVE SERVICES FREQUENCY &  COVERAGE DETAILS LAST  COMPLETION DATE   Diabetes Screening    Fasting Blood Sugar /  Glucose    One screening every 12 months if never tested or if previously tested but not diagnosed with pre-diabetes   One screening every 6 months if diagnosed with pre-diabetes Lab Results   Component Value Date    GLU 91 11/21/2023        Cardiovascular Disease Screening    Lipid Panel  Cholesterol  Lipoprotein (HDL)  Triglycerides Covered every 5 years for all Medicare beneficiaries without apparent signs or symptoms of cardiovascular disease Lab Results   Component Value Date    CHOLEST 137 11/21/2023    HDL 45 11/21/2023    LDL 67 11/21/2023    TRIG 144 11/21/2023         Electrocardiogram (EKG)   Covered if needed at Welcome to Medicare, and non-screening if indicated for medical reasons -      Ultrasound Screening for Abdominal Aortic Aneurysm (AAA) Covered once in a lifetime for one of the following risk factors    Men who are 65-75 years old and have ever smoked    Anyone with a family history -     Colorectal Cancer Screening  Covered for ages 50-85; only need ONE of the following:    Colonoscopy   Covered every 10 years    Covered every 2 years if patient is at high risk or previous colonoscopy was abnormal 02/01/2020    Health Maintenance   Topic Date Due    Colorectal Cancer Screening  02/01/2030       Flexible Sigmoidoscopy   Covered every 4 years -    Fecal Occult Blood Test Covered annually -   Bone Density Screening    Bone density screening    Covered every 2 years after age 65 if diagnosed with risk of osteoporosis or estrogen deficiency.    Covered yearly for long-term glucocorticoid medication use (Steroids) Last Dexa Scan:    XR DEXA BONE DENSITOMETRY (CPT=77080) 07/20/2023      No recommendations at this time   Pap and Pelvic    Pap   Covered every 2 years for women at normal risk; Annually if at high risk -  No recommendations at this time    Chlamydia Annually if high risk -  No recommendations at this time   Screening Mammogram     Mammogram     Recommend annually for all female patients aged 40 and older    One baseline mammogram covered for patients aged 35-39 03/17/2023    Health Maintenance   Topic Date Due    Mammogram  03/17/2024       Immunizations    Influenza Covered once per flu season  Please get every year -  Influenza Vaccine(1) due on 10/01/2023    Pneumococcal Each vaccine ([id number] & Wabtdaybz83) covered once after 65 Prevnar 13: -    [id number]: -     No recommendations at this time    Hepatitis B One screening covered for patients with certain risk factors   -  No recommendations at this time    Tetanus Toxoid Not covered by Medicare Part B unless medically necessary (cut with metal); may be covered with your pharmacy prescription benefits -    Tetanus, Diptheria and Pertusis TD and TDaP Not covered by Medicare Part B -  No recommendations at this time    Zoster Not covered by Medicare Part B; may be covered with your pharmacy  prescription benefits -  No recommendations at this time     Diabetes      Hemoglobin A1C Annually; if last result is elevated, may be asked to retest more frequently.    Medicare covers every 3 months Lab Results   Component Value Date     (H) 11/21/2023    A1C 9.1 (A) 01/05/2024       Hemoglobin A1C Test due on 04/05/2024    Creat/alb ratio Annually Lab Results   Component Value Date    MICROALBCREA  11/21/2023      Comment:      Unable to calculate due to Urine Microalbumin <0.3 mg/dL           LDL Annually Lab Results   Component Value Date    LDL 67 11/21/2023       Dilated Eye Exam Annually Last Diabetic Eye Exam:  Last Dilated Eye Exam: 07/05/23  Eye Exam shows Diabetic Retinopathy?: No       Annual Monitoring of Persistent Medications (ACE/ARB, digoxin diuretics, anticonvulsants)    Potassium Annually Lab Results   Component Value Date    K 4.0 11/21/2023         Creatinine   Annually Lab Results   Component Value Date    CREATSERUM 0.82 11/21/2023         BUN Annually Lab Results    Component Value Date    BUN 8 (L) 11/21/2023       Drug Serum Conc Annually No results found for: \"DIGOXIN\", \"DIG\", \"VALP\"

## 2024-03-05 NOTE — PATIENT INSTRUCTIONS
PLEASE OFFICE NOTE FAX RESULTS -[phone], ATTENTION DR. ADKINS    POR FAVOR, NOTA DE LA OFICINA ENVÍE LOS RESULTADOS POR FAX -[phone], ATENCIÓN DR. GUERRA

## 2024-03-08 ENCOUNTER — APPOINTMENT (OUTPATIENT)
Dept: MAMMOGRAPHY | Age: 69
End: 2024-03-08
Attending: INTERNAL MEDICINE

## 2024-03-09 ENCOUNTER — HOSPITAL ENCOUNTER (OUTPATIENT)
Dept: MAMMOGRAPHY | Age: 69
Discharge: HOME | End: 2024-03-09
Attending: INTERNAL MEDICINE

## 2024-03-09 DIAGNOSIS — Z12.31 ENCOUNTER FOR SCREENING MAMMOGRAM FOR MALIGNANT NEOPLASM OF BREAST: ICD-10-CM

## 2024-03-09 PROCEDURE — 77063 BREAST TOMOSYNTHESIS BI: CPT

## 2024-03-12 ENCOUNTER — HOSPITAL ENCOUNTER (OUTPATIENT)
Dept: RADIATION ONCOLOGY | Age: 69
Discharge: HOME | End: 2024-03-13
Attending: PHYSICIAN ASSISTANT

## 2024-03-12 VITALS
OXYGEN SATURATION: 100 % | RESPIRATION RATE: 14 BRPM | BODY MASS INDEX: 27.61 KG/M2 | HEART RATE: 65 BPM | WEIGHT: 158.18 LBS | SYSTOLIC BLOOD PRESSURE: 136 MMHG | DIASTOLIC BLOOD PRESSURE: 83 MMHG | TEMPERATURE: 97.9 F

## 2024-03-12 RX ORDER — SEMAGLUTIDE 1.34 MG/ML
0.25 INJECTION, SOLUTION SUBCUTANEOUS
COMMUNITY

## 2024-03-12 RX ORDER — OXYBUTYNIN CHLORIDE 5 MG/1
5 TABLET ORAL
COMMUNITY
Start: 2023-03-17

## 2024-03-12 ASSESSMENT — PATIENT HEALTH QUESTIONNAIRE - PHQ9
CLINICAL INTERPRETATION OF PHQ2 SCORE: NO FURTHER SCREENING NEEDED
SUM OF ALL RESPONSES TO PHQ9 QUESTIONS 1 AND 2: 0
2. FEELING DOWN, DEPRESSED OR HOPELESS: NOT AT ALL
SUM OF ALL RESPONSES TO PHQ9 QUESTIONS 1 AND 2: 0
1. LITTLE INTEREST OR PLEASURE IN DOING THINGS: NOT AT ALL

## 2024-03-12 ASSESSMENT — PAIN SCALES - GENERAL: PAINLEVEL: 0

## 2024-03-12 ASSESSMENT — ENCOUNTER SYMPTOMS
RESPIRATORY NEGATIVE: 1
EYES NEGATIVE: 1
GASTROINTESTINAL NEGATIVE: 1
NEUROLOGICAL NEGATIVE: 1
PSYCHIATRIC NEGATIVE: 1
CONSTITUTIONAL NEGATIVE: 1

## 2024-03-13 ASSESSMENT — ENCOUNTER SYMPTOMS
RESPIRATORY NEGATIVE: 1
HEMATOLOGIC/LYMPHATIC NEGATIVE: 1
CONSTITUTIONAL NEGATIVE: 1
PSYCHIATRIC NEGATIVE: 1
NEUROLOGICAL NEGATIVE: 1
ENDOCRINE NEGATIVE: 1
GASTROINTESTINAL NEGATIVE: 1
EYES NEGATIVE: 1

## 2024-03-14 ENCOUNTER — OFFICE VISIT (OUTPATIENT)
Dept: HEMATOLOGY/ONCOLOGY | Age: 69
End: 2024-03-14
Attending: INTERNAL MEDICINE

## 2024-03-14 ENCOUNTER — APPOINTMENT (OUTPATIENT)
Dept: HEMATOLOGY/ONCOLOGY | Age: 69
End: 2024-03-14
Attending: INTERNAL MEDICINE

## 2024-03-14 VITALS
SYSTOLIC BLOOD PRESSURE: 115 MMHG | HEART RATE: 66 BPM | WEIGHT: 158.73 LBS | DIASTOLIC BLOOD PRESSURE: 70 MMHG | RESPIRATION RATE: 16 BRPM | OXYGEN SATURATION: 99 % | TEMPERATURE: 97.7 F | BODY MASS INDEX: 27.71 KG/M2

## 2024-03-14 DIAGNOSIS — Z12.31 ENCOUNTER FOR SCREENING MAMMOGRAM FOR MALIGNANT NEOPLASM OF BREAST: Primary | ICD-10-CM

## 2024-03-14 PROCEDURE — 99211 OFF/OP EST MAY X REQ PHY/QHP: CPT

## 2024-03-14 RX ORDER — ATORVASTATIN CALCIUM 20 MG/1
20 TABLET, FILM COATED ORAL DAILY
COMMUNITY

## 2024-03-14 RX ORDER — DOCUSATE SODIUM 100 MG/1
100 CAPSULE, LIQUID FILLED ORAL 2 TIMES DAILY
COMMUNITY

## 2024-03-14 ASSESSMENT — PATIENT HEALTH QUESTIONNAIRE - PHQ9
2. FEELING DOWN, DEPRESSED OR HOPELESS: NOT AT ALL
1. LITTLE INTEREST OR PLEASURE IN DOING THINGS: NOT AT ALL
SUM OF ALL RESPONSES TO PHQ9 QUESTIONS 1 AND 2: 0
SUM OF ALL RESPONSES TO PHQ9 QUESTIONS 1 AND 2: 0
CLINICAL INTERPRETATION OF PHQ2 SCORE: NO FURTHER SCREENING NEEDED

## 2024-03-14 ASSESSMENT — PAIN SCALES - GENERAL: PAINLEVEL: 0

## 2024-04-01 DIAGNOSIS — R10.13 EPIGASTRIC ABDOMINAL PAIN: ICD-10-CM

## 2024-04-01 NOTE — TELEPHONE ENCOUNTER
Current Outpatient Medications:     famotidine 40 MG Oral Tab, Take 1 tablet (40 mg total) by mouth daily., Disp: 30 tablet, Rfl: 0

## 2024-04-03 NOTE — TELEPHONE ENCOUNTER
Current Warnings (2)  High  High Dose: famotidine, 40 mg, Oral, DailySingle dose of 40 mg exceeds recommended maximum of 20 mg by 100%  Details    Patient also on Pantoprazole.    REFILL PASSED PER [facility] PROTOCOLS  Requested Prescriptions   Pending Prescriptions Disp Refills    famotidine 40 MG Oral Tab 30 tablet 0     Sig: Take 1 tablet (40 mg total) by mouth daily.       Gastrointestional Medication Protocol Passed - 4/1/2024  5:26 PM        Passed - In person appointment or virtual visit in the past 12 mos or appointment in next 3 mos     Recent Outpatient Visits              4 weeks ago Type 2 diabetes mellitus with hyperglycemia, without long-term current use of insulin ([facility])    [facility], Kiara Cheung MD    Office Visit    2 months ago Type 2 diabetes mellitus with hyperglycemia, without long-term current use of insulin ([facility]    Office Visit    3 months ago Epigastric abdominal pain    [facility]    Nurse Only    4 months ago Epigastric abdominal pain    [facility] Gay Edwards MD    Office Visit    4 months ago Type 2 diabetes mellitus with hyperglycemia, without long-term current use of insulin ([facility])    [facility] Fariba Parekr, [place]    Office Visit                             Recent Outpatient Visits              4 weeks ago Type 2 diabetes mellitus with hyperglycemia, without long-term current use of insulin ([facility])    [facility], Kiara Cheung MD    Office Visit    2 months ago Type 2 diabetes mellitus with hyperglycemia, without long-term current use of insulin ([facility])    [facility] Fariba Parker, [place]    Office Visit    3 months ago Epigastric abdominal pain    [facility]    4 months ago Epigastric abdominal pain    [facility] Gay Edwards MD    Office Visit    4 months ago Type 2 diabetes mellitus with hyperglycemia, without long-term current use of insulin (HCC)    [facility], Fariba Parker APRN    Office Visit

## 2024-04-04 RX ORDER — FAMOTIDINE 40 MG/1
40 TABLET, FILM COATED ORAL DAILY
Qty: 90 TABLET | Refills: 3 | Status: SHIPPED | OUTPATIENT
Start: 2024-04-04

## 2024-05-10 DIAGNOSIS — E11.65 TYPE 2 DIABETES MELLITUS WITH HYPERGLYCEMIA, WITHOUT LONG-TERM CURRENT USE OF INSULIN (HCC): ICD-10-CM

## 2024-05-10 NOTE — TELEPHONE ENCOUNTER
Patient called to request refill on below medication. Patient is out of medication.     Patient states she picks it up at the clinic.    Patient also advised that the medication should be free and covered by her Insurance      Medication Quantity Refills Start End   semaglutide 4 MG/3ML Subcutaneous Solution Pen-injector 1 each 5 1/5/2024 --   Sig:   Inject 1 mg into the skin once a week.

## 2024-05-13 RX ORDER — SEMAGLUTIDE 0.68 MG/ML
0.5 INJECTION, SOLUTION SUBCUTANEOUS WEEKLY
Qty: 1 EACH | Refills: 1 | OUTPATIENT
Start: 2024-05-13

## 2024-05-13 NOTE — TELEPHONE ENCOUNTER
Condition update: Patient called navigator stating she has not received a refill from the [employer] [facility] Patient Assistance program. Navigator called [facility] to obtain status on the refill for Ozempic. Per representative refill was due May 8th and was not process. Per representative unsure why the medication was not process. A 30-day voucher was processed to local pharmacy.   New prescription is needed, message sent to Fariba Sanchez.

## 2024-05-13 NOTE — TELEPHONE ENCOUNTER
Refill passed per [facility] protocol.  Requested Prescriptions   Pending Prescriptions Disp Refills    semaglutide 4 MG/3ML Subcutaneous Solution Pen-injector 1 each 5     Sig: Inject 1 mg into the skin once a week.       Diabetes Medication Protocol Passed - 5/10/2024  3:11 PM        Passed - Last A1C < 7.5 and within past 6 months     Lab Results   Component Value Date    A1C 7.2 (H) 03/05/2024             Passed - In person appointment or virtual visit in the past 6 mos or appointment in next 3 mos     Recent Outpatient Visits              2 months ago Type 2 diabetes mellitus with hyperglycemia, without long-term current use of insulin ([facility]    [facility] Visit    4 months ago Type 2 diabetes mellitus with hyperglycemia, without long-term current use of insulin ([facility])    [facility], Fariba Parker APRN    Office Visit    4 months ago Epigastric abdominal pain    [facility]    Nurse Only    5 months ago Epigastric abdominal pain    [facility] Gay Edwards MD    Office Visit    5 months ago Type 2 diabetes mellitus with hyperglycemia, without long-term current use of insulin ([facility])    [facility], Fariba Parker APRN    Office Visit                      Passed - Microalbumin procedure in past 12 months or taking ACE/ARB        Passed - EGFRCR or GFRNAA > 50     GFR Evaluation  EGFRCR: 87 , resulted on 3/5/2024          Passed - GFR in the past 12 months           Recent Outpatient Visits              2 months ago Type 2 diabetes mellitus with hyperglycemia, without long-term current use of insulin ([facility])    [facility], Kiara Cheung MD    Office Visit    4 months ago Type 2 diabetes mellitus with hyperglycemia, without long-term current use  of insulin ([facility])    [facility], Fariba Parker APRN    Office Visit    4 months ago Epigastric abdominal pain    [facility]    Nurse Only    5 months ago Epigastric abdominal pain    [facility] [place] Gay Edwards MD    Office Visit    5 months ago Type 2 diabetes mellitus with hyperglycemia, without long-term current use of insulin ([facility])    [facility], Fariba Parker APRN    Office Visit

## 2024-06-14 ENCOUNTER — HOSPITAL ENCOUNTER (OUTPATIENT)
Dept: ENDOCRINOLOGY | Age: 69
Discharge: HOME | End: 2024-06-14
Attending: NURSE PRACTITIONER
Payer: MEDICARE

## 2024-06-14 NOTE — PROGRESS NOTES
General: Patient came in to  Ozempic 1 mg injection provided by the PAP. I reviewed current medications. Per patient has been adherent with prescribed medications as directed. No side effects or concerns. I reviewed dose titration to 1 mg, encourage patient to call with any questions or concerns. Patient verbalized understanding all question answered.     Ozempic 4mg/3mL  NDC: 5268-0786-33  Exp: 10/31/2026  Lot: [id number]   QTY: 1 box

## 2024-07-12 ENCOUNTER — OFFICE VISIT (OUTPATIENT)
Dept: FAMILY MEDICINE CLINIC | Facility: CLINIC | Age: 69
End: 2024-07-12
Payer: MEDICARE

## 2024-07-12 VITALS
SYSTOLIC BLOOD PRESSURE: 126 MMHG | HEART RATE: 79 BPM | WEIGHT: 155.81 LBS | BODY MASS INDEX: 27.61 KG/M2 | DIASTOLIC BLOOD PRESSURE: 76 MMHG | HEIGHT: 63 IN

## 2024-07-12 DIAGNOSIS — S29.012A MUSCLE STRAIN OF RIGHT UPPER BACK, INITIAL ENCOUNTER: Primary | ICD-10-CM

## 2024-07-12 PROCEDURE — 99213 OFFICE O/P EST LOW 20 MIN: CPT

## 2024-07-12 RX ORDER — CYCLOBENZAPRINE HCL 5 MG
5 TABLET ORAL NIGHTLY PRN
Qty: 30 TABLET | Refills: 0 | Status: SHIPPED | OUTPATIENT
Start: 2024-07-12

## 2024-07-12 RX ORDER — NAPROXEN 250 MG/1
250 TABLET ORAL 2 TIMES DAILY WITH MEALS
Qty: 30 TABLET | Refills: 0 | Status: SHIPPED | OUTPATIENT
Start: 2024-07-12

## 2024-07-12 NOTE — PROGRESS NOTES
Subjective:   Alena Mathur is a 69 year old female who presents for Back Pain (Past 3 weeks, )   Patient is a pleasant 69-year-old female past medical history consistent for breast cancer, diabetes, hypertension, hyperlipidemia.  Patient presents to office today for evaluation of back pain.  Patient states via [id number] Jesús she has pain in her back. It has been about 3 weeks. Its on the right side and the upper part of her back. She has recently lifted and moved a sofa. She has had this before and it was mild. Now she moved the sofa and it has gotten worse. She has burning and itching in her back. She has tightness in right upper back.     Kub: 12/5/23: Degenerative change in lower thoracic and lumbar spine.  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        Past Medical History:    Breast cancer (HCC)    Diabetes (HCC)    Essential hypertension    High blood pressure    High cholesterol    Hyperlipidemia    PONV (postoperative nausea and vomiting)      Past Surgical History:   Procedure Laterality Date    Colonoscopy  02/2020    Eye surgery Left     Foot surgery Left     Hysterectomy          History/Other:    Chief Complaint Reviewed and Verified  Nursing Notes Reviewed and   Verified  Tobacco Reviewed  Allergies Reviewed  Medications Reviewed    Medical History Reviewed  Surgical History Reviewed  OB Status Reviewed    Family History Reviewed  Social History Reviewed         Tobacco:  She has never smoked tobacco.    Current Outpatient Medications   Medication Sig Dispense Refill    naproxen 250 MG Oral Tab Take 1 tablet (250 mg total) by mouth 2 (two) times daily with meals. 30 tablet 0    cyclobenzaprine 5 MG Oral Tab Take 1 tablet (5 mg total) by mouth nightly as needed. 30 tablet 0    semaglutide (OZEMPIC, 0.25 OR 0.5 MG/DOSE,) 2 MG/3ML Subcutaneous Solution Pen-injector Inject 0.5 mg into the skin once a week. 1 each 1    semaglutide 4 MG/3ML  Subcutaneous Solution Pen-injector Inject 1 mg into the skin once a week. 3 mL 3    famotidine 40 MG Oral Tab Take 1 tablet (40 mg total) by mouth daily. 90 tablet 3    oxybutynin 5 MG Oral Tab Take 1 tablet (5 mg total) by mouth daily as needed. 90 tablet 3    MAGNESIUM MALATE OR Take by mouth.      lisinopril-hydroCHLOROthiazide 20-12.5 MG Oral Tab Take 1 tablet by mouth daily. 90 tablet 3    Insulin Pen Needle (PEN NEEDLES) 32G X 4 MM Does not apply Misc 1 each every 7 days. 30 each 0    docusate sodium 100 MG Oral Cap Take 1 capsule (100 mg total) by mouth 2 (two) times daily as needed for constipation. (Patient not taking: Reported on 3/5/2024) 60 capsule 0    pantoprazole 40 MG Oral Tab EC Take 1 tablet (40 mg total) by mouth before breakfast. (Patient not taking: Reported on 3/5/2024) 90 tablet 3    atorvastatin 20 MG Oral Tab Take 1 tablet (20 mg total) by mouth daily. (Patient not taking: Reported on 3/5/2024) 90 tablet 3    Olopatadine HCl 0.2 % Ophthalmic Solution Apply 1 drop to eye daily. (Patient not taking: Reported on 3/5/2024) 2.5 mL 3    metFORMIN 500 MG Oral Tab Take 1 tablet (500 mg total) by mouth 2 (two) times daily with meals. (Patient not taking: Reported on 3/5/2024) 180 tablet 3    glipiZIDE 5 MG Oral Tab Take 1 tablet (5 mg total) by mouth every morning before breakfast. (Patient not taking: Reported on 3/5/2024) 90 tablet 3    prednisoLONE 1 % Ophthalmic Suspension  (Patient not taking: Reported on 3/5/2024)           Review of Systems:  Review of Systems   Respiratory:  Negative for shortness of breath.    Cardiovascular:  Negative for chest pain.   Musculoskeletal:  Positive for arthralgias, back pain and myalgias.   All other systems reviewed and are negative.        Objective:   /76 (BP Location: Right arm, Patient Position: Sitting, Cuff Size: large)   Pulse 79   Ht 5' 3\" (1.6 m)   Wt 155 lb 12.8 oz (70.7 kg)   BMI 27.60 kg/m²  Estimated body mass index is 27.6 kg/m² as  calculated from the following:    Height as of this encounter: 5' 3\" (1.6 m).    Weight as of this encounter: 155 lb 12.8 oz (70.7 kg).  Physical Exam  Vitals and nursing note reviewed.   Constitutional:       General: She is not in acute distress.     Appearance: Normal appearance. She is well-developed and normal weight.   HENT:      Head: Normocephalic and atraumatic.      Right Ear: External ear normal.      Left Ear: External ear normal.   Eyes:      Conjunctiva/sclera: Conjunctivae normal.      Pupils: Pupils are equal, round, and reactive to light.   Neck:      Thyroid: No thyromegaly.   Cardiovascular:      Rate and Rhythm: Normal rate and regular rhythm.      Pulses: Normal pulses.      Heart sounds: Normal heart sounds. No murmur heard.  Pulmonary:      Effort: Pulmonary effort is normal. No respiratory distress.      Breath sounds: Normal breath sounds. No wheezing or rales.   Chest:      Chest wall: No tenderness.   Abdominal:      General: Bowel sounds are normal. There is no distension.      Palpations: Abdomen is soft.      Tenderness: There is no abdominal tenderness.   Musculoskeletal:         General: Tenderness present. No swelling, deformity or signs of injury.      Cervical back: Normal range of motion and neck supple.      Comments: Pain with speed test  Pain at bottom of scapula into spine.     Lymphadenopathy:      Cervical: No cervical adenopathy.   Skin:     General: Skin is warm and dry.      Capillary Refill: Capillary refill takes less than 2 seconds.      Findings: No rash.   Neurological:      Mental Status: She is alert and oriented to person, place, and time.      Coordination: Coordination normal.   Psychiatric:         Behavior: Behavior normal.         Thought Content: Thought content normal.         Judgment: Judgment normal.           Assessment & Plan:   1. Muscle strain of right upper back, initial encounter (Primary)  -     Naproxen; Take 1 tablet (250 mg total) by mouth 2  (two) times daily with meals.  Dispense: 30 tablet; Refill: 0  -     Cyclobenzaprine HCl; Take 1 tablet (5 mg total) by mouth nightly as needed.  Dispense: 30 tablet; Refill: 0  Strain secondary to lifting heavy furniture.  Advised RICE  Start naproxen BID PRN  Muscle relaxer nightly as needed.  Follow up 2 weeks  PT and imaging as needed.       Return in about 2 weeks (around 7/26/2024).    FEDERICO Wang, 7/12/2024, 8:03 AM

## 2024-07-17 ENCOUNTER — HOSPITAL ENCOUNTER (OUTPATIENT)
Dept: ENDOCRINOLOGY | Age: 69
Discharge: HOME | End: 2024-07-17
Attending: NURSE PRACTITIONER
Payer: MEDICARE

## 2024-08-03 DIAGNOSIS — I10 ESSENTIAL HYPERTENSION: ICD-10-CM

## 2024-08-03 DIAGNOSIS — E78.2 MIXED HYPERLIPIDEMIA: ICD-10-CM

## 2024-08-07 RX ORDER — ATORVASTATIN CALCIUM 20 MG/1
20 TABLET, FILM COATED ORAL DAILY
Qty: 90 TABLET | Refills: 3 | Status: SHIPPED | OUTPATIENT
Start: 2024-08-07

## 2024-08-07 RX ORDER — LISINOPRIL AND HYDROCHLOROTHIAZIDE 20; 12.5 MG/1; MG/1
1 TABLET ORAL DAILY
Qty: 90 TABLET | Refills: 3 | Status: SHIPPED | OUTPATIENT
Start: 2024-08-07

## 2024-08-07 NOTE — TELEPHONE ENCOUNTER
Refill passes per [facility] protocol.    Future Appointments   Date Time Provider Department Center   9/3/2024 10:15 AM Kiara Feliz MD [facility]     LAST OFFICE VISIT: 7/12/2024    Requested Prescriptions   Pending Prescriptions Disp Refills    LISINOPRIL-HYDROCHLOROTHIAZIDE 20-12.5 MG Oral Tab [Pharmacy Med Name: LISINOPRIL-HCTZ 20-12.5 MG TAB] 90 tablet 3     Sig: TOMLARA SHELBY TABLETCRISTÓBAL TODOS LOS BROWN       Hypertension Medications Protocol Passed - 8/3/2024  7:00 AM        Passed - CMP or BMP in past 12 months        Passed - Last BP reading less than 140/90     BP Readings from Last 1 Encounters:   07/12/24 126/76               Passed - In person appointment or virtual visit in the past 12 mos or appointment in next 3 mos     Recent Outpatient Visits              3 weeks ago Muscle strain of right upper back, initial encounter    [facility] Darrel Luz APRN    Office Visit    5 months ago Type 2 diabetes mellitus with hyperglycemia, without long-term current use of insulin ([facility])    [facility] Kiara Feliz MD    Office Visit    7 months ago Type 2 diabetes mellitus with hyperglycemia, without long-term current use of insulin ([facility])    [facility] Fariba Sanchez APRN    Office Visit    7 months ago Epigastric abdominal pain    [facility]    Nurse Only    8 months ago Epigastric abdominal pain    [facility] Gay Edwards MD    Office Visit          Future Appointments         Provider Department Appt Notes    In 3 weeks Kiara Feliz MD [facility]                     Passed - EGFRCR or GFRNAA > 50     GFR Evaluation  EGFRCR: 87 , resulted on 3/5/2024            ATORVASTATIN 20 MG Oral Tab [Pharmacy Med Name: ATORVASTATIN 20 MG TABLET] 90 tablet 3     Sig:  ANDRE MONTOYA BROWN       Cholesterol Medication Protocol Passed - 8/3/2024  7:00 AM        Passed - ALT < 80     Lab Results   Component Value Date    ALT 22 03/05/2024             Passed - ALT resulted within past year        Passed - Lipid panel within past 12 months     Lab Results   Component Value Date    CHOLEST 189 03/05/2024    TRIG 121 03/05/2024    HDL 48 03/05/2024     (H) 03/05/2024    VLDL 21 03/05/2024    NONHDLC 141 (H) 03/05/2024             Passed - In person appointment or virtual visit in the past 12 mos or appointment in next 3 mos     Recent Outpatient Visits              3 weeks ago Muscle strain of right upper back, initial encounter    [facility]    Office Visit    5 months ago Type 2 diabetes mellitus with hyperglycemia, without long-term current use of insulin ([facility])    [facility] Kiara Feliz MD    Office Visit    7 months ago Type 2 diabetes mellitus with hyperglycemia, without long-term current use of insulin ([facility]    Office Visit    7 months ago Epigastric abdominal pain    [facility]    Nurse Only    8 months ago Epigastric abdominal pain    [facility] Gay Edwards MD    Office Visit          Future Appointments         Provider Department Appt Notes    In 3 weeks [facility]                          Future Appointments         Provider Department Appt Notes    In 3 weeks Kiara Feliz MD [facility]           Recent Outpatient Visits              3 weeks ago Muscle strain of right upper back, initial encounter    [facility]    [facility] Visit    5  months ago Type 2 diabetes mellitus with hyperglycemia, without long-term current use of insulin (HCC)    [facility], Kiara Cheung MD    Office Visit    7 months ago Type 2 diabetes mellitus with hyperglycemia, without long-term current use of insulin ([facility])    [facility], Fariba Parker APRN    Office Visit    7 months ago Epigastric abdominal pain    [facility]    Nurse Only    8 months ago Epigastric abdominal pain    [facility] Gay Edwards MD    Office Visit

## 2024-09-03 ENCOUNTER — OFFICE VISIT (OUTPATIENT)
Dept: FAMILY MEDICINE CLINIC | Facility: CLINIC | Age: 69
End: 2024-09-03
Payer: MEDICARE

## 2024-09-03 VITALS
HEART RATE: 60 BPM | WEIGHT: 152 LBS | SYSTOLIC BLOOD PRESSURE: 112 MMHG | DIASTOLIC BLOOD PRESSURE: 73 MMHG | BODY MASS INDEX: 27 KG/M2

## 2024-09-03 DIAGNOSIS — E11.65 TYPE 2 DIABETES MELLITUS WITH HYPERGLYCEMIA, WITHOUT LONG-TERM CURRENT USE OF INSULIN (HCC): Primary | ICD-10-CM

## 2024-09-03 DIAGNOSIS — E78.2 MIXED HYPERLIPIDEMIA: ICD-10-CM

## 2024-09-03 DIAGNOSIS — I10 ESSENTIAL HYPERTENSION: ICD-10-CM

## 2024-09-03 LAB — HEMOGLOBIN A1C: 6.9 % (ref 4.3–5.6)

## 2024-09-03 PROCEDURE — G2211 COMPLEX E/M VISIT ADD ON: HCPCS | Performed by: FAMILY MEDICINE

## 2024-09-03 PROCEDURE — 83036 HEMOGLOBIN GLYCOSYLATED A1C: CPT | Performed by: FAMILY MEDICINE

## 2024-09-03 PROCEDURE — 99214 OFFICE O/P EST MOD 30 MIN: CPT | Performed by: FAMILY MEDICINE

## 2024-09-03 NOTE — PROGRESS NOTES
Chief Complaint   Patient presents with    Follow - Up     DM     HPI:   Alena Mathur is a 69 year old female who presents to clinic for DM follow-up.     Has been consistent with using Ozempic. No s/e w current dosing but does notice some skin sagging on her hands/neck.   Her A1c in the office today 6.9%.  Blood pressure well-controlled.    Patient compliant with medication.    REVIEW OF SYSTEMS:   Negative, except per HPI.     EXAM:   /73 (BP Location: Right arm, Patient Position: Sitting, Cuff Size: adult)   Pulse 60   Wt 152 lb (68.9 kg)   BMI 26.93 kg/m²   Body mass index is 26.93 kg/m².  GENERAL: well developed, well nourished, in no apparent distress  SKIN: no rashes, no suspicious lesions  HEENT: atraumatic, normocephalic  EYES: PERRLA, EOMI,conjunctiva are clear  NECK: supple, no adenopathy  LUNGS: clear to auscultation  CARDIO: RRR without murmur    ASSESSMENT AND PLAN:     1. Type 2 diabetes mellitus with hyperglycemia, without long-term current use of insulin (HCC)  - Stable condition, continue present management.    Continue ozempic use. If possible in future can increase dosing to 2 mg /q week. She is getting the ozempic for free from [facility].   - POC Glycohemoglobin [11242]    2. Essential hypertension  - Stable condition, continue present management.      3. Mixed hyperlipidemia  - Stable condition, continue present management.       RTC if no improvement in symptoms. Red flags discussed to go to ER.     Kiara Feliz MD  9/3/2024  10:32 AM

## 2024-10-30 ENCOUNTER — TELEPHONE (OUTPATIENT)
Dept: ENDOCRINOLOGY | Facility: HOSPITAL | Age: 69
End: 2024-10-30

## 2024-11-06 ENCOUNTER — HOSPITAL ENCOUNTER (OUTPATIENT)
Dept: ENDOCRINOLOGY | Age: 69
Discharge: HOME | End: 2024-11-06
Attending: FAMILY MEDICINE
Payer: MEDICARE

## 2025-02-04 DIAGNOSIS — N39.41 URGE INCONTINENCE: ICD-10-CM

## 2025-02-05 ENCOUNTER — HOSPITAL ENCOUNTER (OUTPATIENT)
Dept: ENDOCRINOLOGY | Age: 70
Discharge: HOME | End: 2025-02-05
Attending: FAMILY MEDICINE
Payer: MEDICARE

## 2025-02-05 NOTE — PROGRESS NOTES
General: Met with patient to review the [employer] re-enrollment patient assistance program. I reviewed the qualifications and conditions. Patient agreed and singed the application, and a copy was provided to the patient. Patient came in to  the Ozempic injections provided by the [employer] PAP. I reviewed current medications. Per patient continues to be adherent with prescribed medications as directed. No side effects or concerns. I encourage patient to call with any questions or concerns, patient verbalized understanding all questions answered.       Lab Results   Component Value Date    A1C 6.9 (A) 09/03/2024    A1C 7.2 (H) 03/05/2024      Appointments: I reminded patient to schedule a follow up appointment with PCP.     Plan: PAP follow up     Ozempic 1mg   NDC: [id number]  Lot #: [id number]  Exp: 07/31/2027  QTY: 4 boxes

## 2025-02-07 RX ORDER — OXYBUTYNIN CHLORIDE 5 MG/1
5 TABLET ORAL DAILY PRN
Qty: 90 TABLET | Refills: 3 | Status: SHIPPED | OUTPATIENT
Start: 2025-02-07

## 2025-02-07 NOTE — TELEPHONE ENCOUNTER
Refill passed per [facility] protocol.    Requested Prescriptions   Pending Prescriptions Disp Refills    OXYBUTYNIN 5 MG Oral Tab [Pharmacy Med Name: OXYBUTYNIN 5 MG TABLET] 90 tablet 3     Sig: TAKE 1 TABLET BY MOUTH DAILY AS NEEDED.       Genitourinary Medications Passed - 2/7/2025  7:38 AM        Passed - Patient does not have pulmonary hypertension on problem list        Passed - In person appointment or virtual visit in the past 12 mos or appointment in next 3 mos     Recent Outpatient Visits              5 months ago Type 2 diabetes mellitus with hyperglycemia, without long-term current use of insulin ([facility])    [facility]    Office Visit    7 months ago Muscle strain of right upper back, initial encounter    [facility]    Office Visit    11 months ago Type 2 diabetes mellitus with hyperglycemia, without long-term current use of insulin ([facility])    [facility]    Office Visit    1 year ago Type 2 diabetes mellitus with hyperglycemia, without long-term current use of insulin ([facility])    [facility]    Office Visit    1 year ago Epigastric abdominal pain    [facility]    Nurse Only                      Passed - Medication is active on med list             Recent Outpatient Visits              5 months ago Type 2 diabetes mellitus with hyperglycemia, without long-term current use of insulin ([facility])    [facility]    Office Visit    7 months ago Muscle strain of right upper back, initial encounter    [facility]    Office Visit    11 months ago Type 2 diabetes mellitus with hyperglycemia, without long-term current use of  insulin (HCC)    [facility]    Office Visit    1 year ago Type 2 diabetes mellitus with hyperglycemia, without long-term current use of insulin ([facility])    [facility], [address], Fariba Parker APRN    Office Visit    1 year ago Epigastric abdominal pain    [facility], [facility]    Nurse Only

## 2025-03-12 ENCOUNTER — HOSPITAL ENCOUNTER (OUTPATIENT)
Dept: CT IMAGING | Age: 70
Discharge: HOME | End: 2025-03-12
Attending: INTERNAL MEDICINE

## 2025-03-12 DIAGNOSIS — Z12.31 ENCOUNTER FOR SCREENING MAMMOGRAM FOR MALIGNANT NEOPLASM OF BREAST: ICD-10-CM

## 2025-03-12 PROCEDURE — 77063 BREAST TOMOSYNTHESIS BI: CPT

## 2025-03-17 ENCOUNTER — OFFICE VISIT (OUTPATIENT)
Dept: HEMATOLOGY/ONCOLOGY | Age: 70
End: 2025-03-17
Attending: INTERNAL MEDICINE

## 2025-03-17 VITALS
TEMPERATURE: 97.7 F | RESPIRATION RATE: 16 BRPM | OXYGEN SATURATION: 99 % | DIASTOLIC BLOOD PRESSURE: 86 MMHG | SYSTOLIC BLOOD PRESSURE: 157 MMHG | HEART RATE: 82 BPM | WEIGHT: 153.77 LBS | BODY MASS INDEX: 26.84 KG/M2

## 2025-03-17 DIAGNOSIS — Z12.31 ENCOUNTER FOR SCREENING MAMMOGRAM FOR MALIGNANT NEOPLASM OF BREAST: Primary | ICD-10-CM

## 2025-03-17 DIAGNOSIS — Z85.3 HISTORY OF BREAST CANCER IN FEMALE: ICD-10-CM

## 2025-03-17 PROCEDURE — 99211 OFF/OP EST MAY X REQ PHY/QHP: CPT

## 2025-03-17 RX ORDER — IBUPROFEN 600 MG/1
600 TABLET, FILM COATED ORAL EVERY 12 HOURS
Qty: 20 TABLET | Refills: 0 | Status: SHIPPED | OUTPATIENT
Start: 2025-03-17

## 2025-03-17 ASSESSMENT — PAIN SCALES - GENERAL: PAINLEVEL: 7

## 2025-03-17 ASSESSMENT — PATIENT HEALTH QUESTIONNAIRE - PHQ9
SUM OF ALL RESPONSES TO PHQ9 QUESTIONS 1 AND 2: 2
CLINICAL INTERPRETATION OF PHQ2 SCORE: NO FURTHER SCREENING NEEDED
SUM OF ALL RESPONSES TO PHQ9 QUESTIONS 1 AND 2: 2
1. LITTLE INTEREST OR PLEASURE IN DOING THINGS: SEVERAL DAYS
2. FEELING DOWN, DEPRESSED OR HOPELESS: SEVERAL DAYS

## 2025-04-05 NOTE — PROGRESS NOTES
General: Met with Mr. Dinh to review the [employer] patient assistance program. I reviewed qualifications and conditions for the foundation. Patient agreed and signed application. Reviewed current medications, per patient continues to be adherent with prescribed medications as directed. No side effects or concerns. I encourage to call with any questions, patient verbalized understanding all questions answered.     Monitoring: Explained the importance to check blood sugar and maintain a daily log    Lab Results   Component Value Date    A1C 6.9 (A) 09/03/2024    A1C 7.2 (H) 03/05/2024      Plan: PAP follow up      [employer]   Fax:[phone]  Fax confirmation rec'd

## 2025-05-06 ENCOUNTER — LAB ENCOUNTER (OUTPATIENT)
Dept: LAB | Age: 70
End: 2025-05-06
Attending: FAMILY MEDICINE
Payer: MEDICARE

## 2025-05-06 ENCOUNTER — OFFICE VISIT (OUTPATIENT)
Dept: FAMILY MEDICINE CLINIC | Facility: CLINIC | Age: 70
End: 2025-05-06

## 2025-05-06 VITALS
WEIGHT: 151 LBS | SYSTOLIC BLOOD PRESSURE: 129 MMHG | DIASTOLIC BLOOD PRESSURE: 81 MMHG | BODY MASS INDEX: 27 KG/M2 | HEART RATE: 76 BPM

## 2025-05-06 DIAGNOSIS — E78.2 MIXED HYPERLIPIDEMIA: ICD-10-CM

## 2025-05-06 DIAGNOSIS — Z00.00 ENCOUNTER FOR ANNUAL HEALTH EXAMINATION: ICD-10-CM

## 2025-05-06 DIAGNOSIS — I10 ESSENTIAL HYPERTENSION: ICD-10-CM

## 2025-05-06 DIAGNOSIS — Z00.00 ENCOUNTER FOR ANNUAL HEALTH EXAMINATION: Primary | ICD-10-CM

## 2025-05-06 DIAGNOSIS — M25.50 POLYARTHRALGIA: ICD-10-CM

## 2025-05-06 DIAGNOSIS — C50.912 MALIGNANT NEOPLASM OF LEFT FEMALE BREAST, UNSPECIFIED ESTROGEN RECEPTOR STATUS, UNSPECIFIED SITE OF BREAST (HCC): ICD-10-CM

## 2025-05-06 DIAGNOSIS — G89.29 OTHER CHRONIC PAIN: ICD-10-CM

## 2025-05-06 DIAGNOSIS — E11.65 TYPE 2 DIABETES MELLITUS WITH HYPERGLYCEMIA, WITHOUT LONG-TERM CURRENT USE OF INSULIN (HCC): ICD-10-CM

## 2025-05-06 DIAGNOSIS — M79.10 MYALGIA: ICD-10-CM

## 2025-05-06 DIAGNOSIS — H10.13 ALLERGIC CONJUNCTIVITIS OF BOTH EYES: ICD-10-CM

## 2025-05-06 DIAGNOSIS — M15.9 OSTEOARTHRITIS OF MULTIPLE JOINTS, UNSPECIFIED OSTEOARTHRITIS TYPE: ICD-10-CM

## 2025-05-06 LAB
ALBUMIN SERPL-MCNC: 4.6 G/DL (ref 3.2–4.8)
ALBUMIN/GLOB SERPL: 1.6 {RATIO} (ref 1–2)
ALP LIVER SERPL-CCNC: 99 U/L (ref 55–142)
ALT SERPL-CCNC: 17 U/L (ref 10–49)
ANION GAP SERPL CALC-SCNC: 9 MMOL/L (ref 0–18)
AST SERPL-CCNC: 21 U/L (ref ?–34)
BASOPHILS # BLD AUTO: 0.05 X10(3) UL (ref 0–0.2)
BASOPHILS NFR BLD AUTO: 0.8 %
BILIRUB SERPL-MCNC: 0.6 MG/DL (ref 0.2–1.1)
BUN BLD-MCNC: 19 MG/DL (ref 9–23)
BUN/CREAT SERPL: 22.4 (ref 10–20)
CALCIUM BLD-MCNC: 9.6 MG/DL (ref 8.7–10.4)
CHLORIDE SERPL-SCNC: 103 MMOL/L (ref 98–112)
CHOLEST SERPL-MCNC: 215 MG/DL (ref ?–200)
CO2 SERPL-SCNC: 28 MMOL/L (ref 21–32)
CREAT BLD-MCNC: 0.85 MG/DL (ref 0.55–1.02)
CREAT UR-SCNC: 110.5 MG/DL
DEPRECATED RDW RBC AUTO: 40.7 FL (ref 35.1–46.3)
EGFRCR SERPLBLD CKD-EPI 2021: 74 ML/MIN/1.73M2 (ref 60–?)
EOSINOPHIL # BLD AUTO: 0.09 X10(3) UL (ref 0–0.7)
EOSINOPHIL NFR BLD AUTO: 1.4 %
ERYTHROCYTE [DISTWIDTH] IN BLOOD BY AUTOMATED COUNT: 12.4 % (ref 11–15)
EST. AVERAGE GLUCOSE BLD GHB EST-MCNC: 154 MG/DL (ref 68–126)
FASTING PATIENT LIPID ANSWER: YES
FASTING STATUS PATIENT QL REPORTED: YES
GLOBULIN PLAS-MCNC: 2.8 G/DL (ref 2–3.5)
GLUCOSE BLD-MCNC: 131 MG/DL (ref 70–99)
HBA1C MFR BLD: 7 % (ref ?–5.7)
HCT VFR BLD AUTO: 41.3 % (ref 35–48)
HDLC SERPL-MCNC: 53 MG/DL (ref 40–59)
HGB BLD-MCNC: 13.8 G/DL (ref 12–16)
IMM GRANULOCYTES # BLD AUTO: 0.05 X10(3) UL (ref 0–1)
IMM GRANULOCYTES NFR BLD: 0.8 %
LDLC SERPL CALC-MCNC: 131 MG/DL (ref ?–100)
LYMPHOCYTES # BLD AUTO: 1.54 X10(3) UL (ref 1–4)
LYMPHOCYTES NFR BLD AUTO: 24.3 %
MCH RBC QN AUTO: 30.1 PG (ref 26–34)
MCHC RBC AUTO-ENTMCNC: 33.4 G/DL (ref 31–37)
MCV RBC AUTO: 90 FL (ref 80–100)
MICROALBUMIN UR-MCNC: <0.3 MG/DL
MONOCYTES # BLD AUTO: 0.45 X10(3) UL (ref 0.1–1)
MONOCYTES NFR BLD AUTO: 7.1 %
NEUTROPHILS # BLD AUTO: 4.16 X10 (3) UL (ref 1.5–7.7)
NEUTROPHILS # BLD AUTO: 4.16 X10(3) UL (ref 1.5–7.7)
NEUTROPHILS NFR BLD AUTO: 65.6 %
NONHDLC SERPL-MCNC: 162 MG/DL (ref ?–130)
OSMOLALITY SERPL CALC.SUM OF ELEC: 294 MOSM/KG (ref 275–295)
PLATELET # BLD AUTO: 289 10(3)UL (ref 150–450)
POTASSIUM SERPL-SCNC: 3.8 MMOL/L (ref 3.5–5.1)
PROT SERPL-MCNC: 7.4 G/DL (ref 5.7–8.2)
RBC # BLD AUTO: 4.59 X10(6)UL (ref 3.8–5.3)
SODIUM SERPL-SCNC: 140 MMOL/L (ref 136–145)
TRIGL SERPL-MCNC: 174 MG/DL (ref 30–149)
TSI SER-ACNC: 1.46 UIU/ML (ref 0.55–4.78)
VLDLC SERPL CALC-MCNC: 32 MG/DL (ref 0–30)
WBC # BLD AUTO: 6.3 X10(3) UL (ref 4–11)

## 2025-05-06 PROCEDURE — G0439 PPPS, SUBSEQ VISIT: HCPCS | Performed by: FAMILY MEDICINE

## 2025-05-06 PROCEDURE — 86038 ANTINUCLEAR ANTIBODIES: CPT

## 2025-05-06 PROCEDURE — 85025 COMPLETE CBC W/AUTO DIFF WBC: CPT

## 2025-05-06 PROCEDURE — 82570 ASSAY OF URINE CREATININE: CPT

## 2025-05-06 PROCEDURE — 84443 ASSAY THYROID STIM HORMONE: CPT

## 2025-05-06 PROCEDURE — 86225 DNA ANTIBODY NATIVE: CPT

## 2025-05-06 PROCEDURE — 80053 COMPREHEN METABOLIC PANEL: CPT

## 2025-05-06 PROCEDURE — 36415 COLL VENOUS BLD VENIPUNCTURE: CPT

## 2025-05-06 PROCEDURE — 80061 LIPID PANEL: CPT

## 2025-05-06 PROCEDURE — 96372 THER/PROPH/DIAG INJ SC/IM: CPT | Performed by: FAMILY MEDICINE

## 2025-05-06 PROCEDURE — 83036 HEMOGLOBIN GLYCOSYLATED A1C: CPT

## 2025-05-06 PROCEDURE — 82043 UR ALBUMIN QUANTITATIVE: CPT

## 2025-05-06 RX ORDER — KETOROLAC TROMETHAMINE 30 MG/ML
30 INJECTION, SOLUTION INTRAMUSCULAR; INTRAVENOUS ONCE
Status: COMPLETED | OUTPATIENT
Start: 2025-05-06 | End: 2025-05-06

## 2025-05-06 RX ORDER — LEVOCETIRIZINE DIHYDROCHLORIDE 5 MG/1
5 TABLET, FILM COATED ORAL EVERY EVENING
Qty: 30 TABLET | Refills: 0 | Status: SHIPPED | OUTPATIENT
Start: 2025-05-06

## 2025-05-06 RX ORDER — DULOXETIN HYDROCHLORIDE 30 MG/1
30 CAPSULE, DELAYED RELEASE ORAL DAILY
Qty: 90 CAPSULE | Refills: 0 | Status: SHIPPED | OUTPATIENT
Start: 2025-05-06

## 2025-05-06 RX ADMIN — KETOROLAC TROMETHAMINE 30 MG: 30 INJECTION, SOLUTION INTRAMUSCULAR; INTRAVENOUS at 10:07:00

## 2025-05-06 NOTE — PROGRESS NOTES
Subjective:   Alena Mathur is a 69 year old female who presents for a Subsequent Annual Wellness visit (Pt already had Initial Annual Wellness) and scheduled follow up of multiple significant but stable problems.   History of Present Illness         Patient has a history of obstructed tear duct.  She gets a lot of tearing and eye itching.  Has tried Pataday and Zaditor with minimal relief of symptoms.  Has not tried an oral tablet antihistamine.  She describes full body pain arthralgias and myalgias.  Does get relief from ibuprofen.  Has never tried a chronic pain medication.      History/Other:   Fall Risk Assessment:   She has been screened for Falls and is High Risk. Fall Prevention information provided to patient in After Visit Summary.    Do you feel unsteady when standing or walking?: No  Do you worry about falling?: Yes  Have you fallen in the past year?: No     Cognitive Assessment:   She had a completely normal cognitive assessment - see flowsheet entries     Functional Ability/Status:   Alena Mathur has some abnormal functions as listed below:  She has difficulties Managing Money/Bills based on screening of functional status.She has Vision problems based on screening of functional status. She has Walking problems based on screening of functional status. She has problems with Memory based on screening of functional status.       Depression Screening (PHQ):  PHQ-2 SCORE: 0  , done 5/6/2025             Advanced Directives:   She does NOT have a Living Will. [ ]  She does NOT have a Power of  for Health Care. [ ]  Discussed Advance Care Planning with patient (and family/surrogate if present). Standard forms made available to patient in After Visit Summary.      Problem List[1]  Allergies:  She has no known allergies.    Current Medications:  Active Meds, Sig Only[2]    Medical History:  She  has a past medical history of Breast cancer (HCC), Diabetes (HCC), Essential hypertension, High blood pressure,  High cholesterol, Hyperlipidemia, and PONV (postoperative nausea and vomiting).  Surgical History:  She  has a past surgical history that includes Eye surgery (Left); hysterectomy; foot surgery (Left); and colonoscopy (02/2020).   Family History:  Her family history includes Cancer in her father; Dementia in her mother.  Social History:  She  reports that she has never smoked. She has never been exposed to tobacco smoke. She has never used smokeless tobacco. She reports that she does not currently use alcohol. She reports that she does not use drugs.    Tobacco:  She has never smoked tobacco.    CAGE Alcohol Screen: 0      Patient Care Team:  Kiara Feliz MD as PCP - General (Family Medicine)    Review of Systems     Negative except per hpi     Objective:   Physical Exam  General appearance: alert  HEENT: Normocephalic, without obvious abnormality, atraumatic. PERRL, EOMI, pink conjunctiva.   Neck: supple, symmetrical, trachea midline, no adenopathy, no JVD and thyroid not enlarged,  Lungs: respirations unlabored, clear to auscultation bilaterally  Heart: regular rate and rhythm, S1, S2 normal, no murmur  Abdomen: normoactive bowel sounds x4; soft, non-distended; nontender; no masses  Extremities: extremities normal, atraumatic, no cyanosis or edema; no erythema or tenderness in calves bilaterally  Neurologic: alert, oriented x3      /81   Pulse 76   Wt 151 lb (68.5 kg)   BMI 26.75 kg/m²  Estimated body mass index is 26.75 kg/m² as calculated from the following:    Height as of 7/12/24: 5' 3\" (1.6 m).    Weight as of this encounter: 151 lb (68.5 kg).    Medicare Hearing Assessment: Hearing appropriate to voice      Vision testing not required for subsequent Medicare annual exam      Assessment & Plan:   Alena Mathur is a 69 year old female who presents for a Medicare Assessment.     1. Encounter for annual health examination  -Medical, surgical and social history, as well as medications and allergies  were reviewed with patient.  - CBC With Differential With Platelet; Future  - Comp Metabolic Panel (14); Future  - Hemoglobin A1C; Future  - Lipid Panel; Future  - TSH W Reflex To Free T4; Future    2. Type 2 diabetes mellitus with hyperglycemia, without long-term current use of insulin (HCC)  - Stable condition, continue present management.    - Microalb/Creat Ratio, Random Urine; Future    3. Essential hypertension  - Stable condition, continue present management.      4. Mixed hyperlipidemia  - Stable condition, continue present management.      5. Malignant neoplasm of left female breast, unspecified estrogen receptor status, unspecified site of breast (HCC)  - Stable condition, continue present management.      6. Allergic conjunctivitis of both eyes    - levocetirizine 5 MG Oral Tab; Take 1 tablet (5 mg total) by mouth every evening.  Dispense: 30 tablet; Refill: 0    7. Polyarthralgia  - Stable condition, continue present management.    - Connective Tissue Disease (CARLY) Screen [E]; Future  - ketorolac (Toradol) 30 MG/ML injection 30 mg  - DULoxetine 30 MG Oral Cap DR Particles; Take 1 capsule (30 mg total) by mouth daily.  Dispense: 90 capsule; Refill: 0    8. Other chronic pain  - Stable condition, continue present management.    - DULoxetine 30 MG Oral Cap DR Particles; Take 1 capsule (30 mg total) by mouth daily.  Dispense: 90 capsule; Refill: 0    9. Myalgia  - Stable condition, continue present management.    - DULoxetine 30 MG Oral Cap DR Particles; Take 1 capsule (30 mg total) by mouth daily.  Dispense: 90 capsule; Refill: 0    10. Osteoarthritis of multiple joints, unspecified osteoarthritis type  - Stable condition, continue present management.    - DULoxetine 30 MG Oral Cap DR Particles; Take 1 capsule (30 mg total) by mouth daily.  Dispense: 90 capsule; Refill: 0            The patient indicates understanding of these issues and agrees to the plan.  Reinforced healthy diet, lifestyle, and  exercise.      No follow-ups on file.     Kiara Feliz MD, 5/6/2025     Supplementary Documentation:   General Health:  In the past six months, have you lost more than 10 pounds without trying?: 2 - No  Has your appetite been poor?: No  Type of Diet: Balanced  How does the patient maintain a good energy level?: Daily Walks  How would you describe your daily physical activity?: Moderate  How would you describe your current health state?: Poor  How do you maintain positive mental well-being?: Puzzles  On a scale of 0 to 10, with 0 being no pain and 10 being severe pain, what is your pain level?: 8 - (Severe)  In the past six months, have you experienced urine leakage?: 1-Yes  At any time do you feel concerned for the safety/well-being of yourself and/or your children, in your home or elsewhere?: No  Have you had any immunizations at another office such as Influenza, Hepatitis B, Tetanus, or Pneumococcal?: No    Health Maintenance   Topic Date Due    COVID-19 Vaccine (4 - 2024-25 season) 09/01/2024    Annual Depression Screening  01/01/2025    Fall Risk Screening (Annual)  01/01/2025    Diabetes Care: Microalb/Creat Ratio (Annual)  01/01/2025    Diabetes Care Foot Exam  01/05/2025    Diabetes Care A1C  03/03/2025    Diabetes Care: GFR  03/05/2025    Annual Physical  03/05/2025    Influenza Vaccine (Season Ended) 10/01/2025    Mammogram  03/12/2026    Diabetes Care Dilated Eye Exam  04/11/2026    Colorectal Cancer Screening  02/01/2030    DEXA Scan  Completed    Pneumococcal Vaccine: 50+ Years  Completed    Zoster Vaccines  Completed    Meningococcal B Vaccine  Aged Out            [1]   Patient Active Problem List  Diagnosis    Diabetes (HCC)    Essential hypertension    Hyperlipidemia    Breast cancer (HCC)   [2]   Outpatient Medications Marked as Taking for the 5/6/25 encounter (Office Visit) with Kiara Feliz MD   Medication Sig    levocetirizine 5 MG Oral Tab Take 1 tablet (5 mg total) by mouth every  evening.    DULoxetine 30 MG Oral Cap DR Particles Take 1 capsule (30 mg total) by mouth daily.    lisinopril-hydroCHLOROthiazide 20-12.5 MG Oral Tab Take 1 tablet by mouth daily.    atorvastatin 20 MG Oral Tab Take 1 tablet (20 mg total) by mouth daily.    semaglutide 4 MG/3ML Subcutaneous Solution Pen-injector Inject 1 mg into the skin once a week.

## 2025-05-11 LAB
DSDNA IGG SERPL IA-ACNC: 2.3 IU/ML (ref ?–10)
ENA AB SER QL IA: 0.2 UG/L (ref ?–0.7)
ENA AB SER QL IA: NEGATIVE

## 2025-05-29 DIAGNOSIS — H10.13 ALLERGIC CONJUNCTIVITIS OF BOTH EYES: ICD-10-CM

## 2025-05-29 RX ORDER — LEVOCETIRIZINE DIHYDROCHLORIDE 5 MG/1
5 TABLET, FILM COATED ORAL EVERY EVENING
Qty: 30 TABLET | Refills: 0 | Status: SHIPPED | OUTPATIENT
Start: 2025-05-29

## 2025-05-29 NOTE — TELEPHONE ENCOUNTER
Pharmacy requesting 90 day      levocetirizine 5 MG Oral Tab, Take 1 tablet (5 mg total) by mouth every evening., Disp: 30 tablet, Rfl: 0

## 2025-06-27 ENCOUNTER — PATIENT MESSAGE (OUTPATIENT)
Dept: FAMILY MEDICINE CLINIC | Facility: CLINIC | Age: 70
End: 2025-06-27

## 2025-06-28 ENCOUNTER — HOSPITAL ENCOUNTER (OUTPATIENT)
Age: 70
Discharge: HOME | End: 2025-06-28
Payer: MEDICARE

## 2025-06-28 ENCOUNTER — APPOINTMENT (OUTPATIENT)
Dept: CT IMAGING | Age: 70
End: 2025-06-28
Attending: NURSE PRACTITIONER
Payer: MEDICARE

## 2025-06-28 VITALS
SYSTOLIC BLOOD PRESSURE: 116 MMHG | RESPIRATION RATE: 16 BRPM | DIASTOLIC BLOOD PRESSURE: 50 MMHG | OXYGEN SATURATION: 99 % | TEMPERATURE: 98 F | HEART RATE: 64 BPM

## 2025-06-28 DIAGNOSIS — H10.32 ACUTE CONJUNCTIVITIS OF LEFT EYE, UNSPECIFIED ACUTE CONJUNCTIVITIS TYPE: Primary | ICD-10-CM

## 2025-06-28 DIAGNOSIS — R42 VERTIGO: ICD-10-CM

## 2025-06-28 DIAGNOSIS — L03.213 PERIORBITAL CELLULITIS OF LEFT EYE: ICD-10-CM

## 2025-06-28 LAB
#MXD IC: 0.6 X10ˆ3/UL (ref 0.1–1)
BUN BLD-MCNC: 14 MG/DL (ref 7–18)
CHLORIDE BLD-SCNC: 102 MMOL/L (ref 98–112)
CO2 BLD-SCNC: 26 MMOL/L (ref 21–32)
CREAT BLD-MCNC: 0.8 MG/DL (ref 0.55–1.02)
EGFRCR SERPLBLD CKD-EPI 2021: 80 ML/MIN/1.73M2 (ref 60–?)
GLUCOSE BLD-MCNC: 132 MG/DL (ref 70–99)
HCT VFR BLD AUTO: 39.1 % (ref 35–48)
HCT VFR BLD CALC: 38 % (ref 34–50)
HGB BLD-MCNC: 12.6 G/DL (ref 12–16)
ISTAT IONIZED CALCIUM FOR CHEM 8: 1.19 MMOL/L (ref 1.12–1.32)
LYMPHOCYTES # BLD AUTO: 1.4 X10ˆ3/UL (ref 1–4)
LYMPHOCYTES NFR BLD AUTO: 24.8 %
MCH RBC QN AUTO: 29.8 PG (ref 26–34)
MCHC RBC AUTO-ENTMCNC: 32.2 G/DL (ref 31–37)
MCV RBC AUTO: 92.4 FL (ref 80–100)
MIXED CELL %: 10 %
NEUTROPHILS # BLD AUTO: 3.6 X10ˆ3/UL (ref 1.5–7.7)
NEUTROPHILS NFR BLD AUTO: 65.2 %
PLATELET # BLD AUTO: 278 X10ˆ3/UL (ref 150–450)
POTASSIUM BLD-SCNC: 3.6 MMOL/L (ref 3.6–5.1)
RBC # BLD AUTO: 4.23 X10ˆ6/UL (ref 3.8–5.3)
SODIUM BLD-SCNC: 139 MMOL/L (ref 136–145)
TROPONIN I BLD-MCNC: <0.02 NG/ML (ref ?–0.05)
WBC # BLD AUTO: 5.6 X10ˆ3/UL (ref 4–11)

## 2025-06-28 PROCEDURE — 85025 COMPLETE CBC W/AUTO DIFF WBC: CPT | Performed by: NURSE PRACTITIONER

## 2025-06-28 PROCEDURE — 36415 COLL VENOUS BLD VENIPUNCTURE: CPT

## 2025-06-28 PROCEDURE — 70450 CT HEAD/BRAIN W/O DYE: CPT | Performed by: NURSE PRACTITIONER

## 2025-06-28 PROCEDURE — 99215 OFFICE O/P EST HI 40 MIN: CPT

## 2025-06-28 PROCEDURE — 93010 ELECTROCARDIOGRAM REPORT: CPT

## 2025-06-28 PROCEDURE — 80047 BASIC METABLC PNL IONIZED CA: CPT

## 2025-06-28 PROCEDURE — 99214 OFFICE O/P EST MOD 30 MIN: CPT

## 2025-06-28 PROCEDURE — 84484 ASSAY OF TROPONIN QUANT: CPT

## 2025-06-28 PROCEDURE — 93005 ELECTROCARDIOGRAM TRACING: CPT

## 2025-06-28 RX ORDER — CEPHALEXIN 500 MG/1
500 CAPSULE ORAL 3 TIMES DAILY
Qty: 30 CAPSULE | Refills: 0 | Status: SHIPPED | OUTPATIENT
Start: 2025-06-28 | End: 2025-07-08

## 2025-06-28 RX ORDER — TOBRAMYCIN 3 MG/ML
2 SOLUTION/ DROPS OPHTHALMIC 3 TIMES DAILY
Qty: 5 ML | Refills: 0 | Status: SHIPPED | OUTPATIENT
Start: 2025-06-28 | End: 2025-07-05

## 2025-06-28 NOTE — ED PROVIDER NOTES
Patient Seen in: [facility]        History  Chief Complaint   Patient presents with    Headache    Dizziness    Eye Visual Problem     Stated Complaint: left eye swollen,dizziness    Subjective:   HPI            69-year-old female primarily Micronesian-speaking presents with her daughter.  Patient reports swelling redness and drainage from her left eye.  Daughter also states they were out in the heat on Thursday.  Patient became dizzy and developed a headache.  She has improved some but still complains of feeling dizzy with movement and a slight headache.  Patient denies chest pain.  There is no shortness of breath.  She is afebrile.      Objective:     Past Medical History:    Breast cancer (HCC)    Diabetes (HCC)    Essential hypertension    High blood pressure    High cholesterol    Hyperlipidemia    PONV (postoperative nausea and vomiting)              Past Surgical History:   Procedure Laterality Date    Colonoscopy  02/2020    Eye surgery Left     Foot surgery Left     Hysterectomy                  Social History     Socioeconomic History    Marital status:    Tobacco Use    Smoking status: Never     Passive exposure: Never    Smokeless tobacco: Never   Vaping Use    Vaping status: Never Used   Substance and Sexual Activity    Alcohol use: Not Currently     Comment: social    Drug use: Never    Sexual activity: Not Currently   Other Topics Concern     Service No    Blood Transfusions No    Caffeine Concern No    Occupational Exposure No    Hobby Hazards No    Sleep Concern No    Stress Concern No    Weight Concern No    Special Diet No    Back Care No    Exercise No    Bike Helmet No    Seat Belt No    Self-Exams No              Review of Systems    Positive for stated complaint: left eye swollen,dizziness  Other systems are as noted in HPI.  Constitutional and vital signs reviewed.      All other systems reviewed and negative except as noted above.                  Physical Exam    ED  Triage Vitals [06/28/25 0807]   /50   Pulse 64   Resp 16   Temp 97.5 °F (36.4 °C)   Temp src Oral   SpO2 99 %   O2 Device None (Room air)       Current Vitals:   Vital Signs  BP: 116/50  Pulse: 64  Resp: 16  Temp: 97.5 °F (36.4 °C)  Temp src: Oral    Oxygen Therapy  SpO2: 99 %  O2 Device: None (Room air)      Right Eye Chart Acuity: 20/25, Corrected  Left Eye Chart Acuity: 20/40, Corrected      Physical Exam  Vitals reviewed.   Constitutional:       General: She is not in acute distress.  Eyes:      General:         Left eye: Discharge present.     Extraocular Movements: Extraocular movements intact.      Pupils: Pupils are equal, round, and reactive to light.        Comments: Positive conjunctival erythema with active drainage.  There is also some scleral erythema.  There is swelling and redness of left upper and lower eyelids.   Cardiovascular:      Rate and Rhythm: Normal rate and regular rhythm.   Pulmonary:      Effort: Pulmonary effort is normal.      Breath sounds: Normal breath sounds.   Skin:     General: Skin is warm and dry.   Neurological:      General: No focal deficit present.      Mental Status: She is alert and oriented to person, place, and time.   Psychiatric:         Mood and Affect: Mood normal.         Behavior: Behavior normal.                 ED Course  Labs Reviewed   POCT ISTAT CHEM8 CARTRIDGE - Abnormal; Notable for the following components:       Result Value    ISTAT Glucose 132 (*)     All other components within normal limits   ISTAT TROPONIN - Normal   POCT CBC     EKG                                  MDM             Medical Decision Making  This is a 69-year-old primarily Greek-speaking female presents with her daughter.  Patient is able to express her concern with redness swelling and drainage from her left eye.  Her daughter also states patient has been dizzy and has had a headache.  Differential diagnosis includes conjunctivitis, periorbital cellulitis, neurological  etiology, hypoglycemia, hyperglycemia, cardiac etiology.  On exam there is positive conjunctival erythema with active drainage.  There is also positive erythema and swelling of left upper and lower eyelids.  There is no visible foreign body.  EKG was done and shows sinus bradycardia with a rate of 57.  There is no prior MUSE to compare.  POC CBC shows normal labs.  POC chemistry shows glucose of 132.  Head CT is done and shows no acute abnormalities.  These results were discussed with patient.  Prescription for Keflex and tobramycin were sent to patient's pharmacy.  She was given strict instructions when she should go to the emergency room.    Amount and/or Complexity of Data Reviewed  Independent Historian:      Details: daughter  Labs: ordered.     Details: POC CBC shows WNL  POC chem 132  Troponin neg  Radiology: ordered.     Details: Head CT images reviewed by IC provider.  Shows  ECG/medicine tests: ordered.     Details: EKG shows rate of 57.  Neg ectopy.  There is no other in MUSE to compare.     Risk  OTC drugs.  Prescription drug management.        Disposition and Plan     Clinical Impression:  1. Acute conjunctivitis of left eye, unspecified acute conjunctivitis type    2. Periorbital cellulitis of left eye    3. Vertigo         Disposition:  Discharge  6/28/2025  9:44 am    Follow-up:  No follow-up provider specified.        Medications Prescribed:  Current Discharge Medication List        START taking these medications    Details   tobramycin 0.3 % Ophthalmic Solution Place 2 drops into the left eye 3 (three) times daily for 7 days.  Qty: 5 mL, Refills: 0      cephALEXin 500 MG Oral Cap Take 1 capsule (500 mg total) by mouth 3 (three) times daily for 10 days.  Qty: 30 capsule, Refills: 0                   Supplementary Documentation:

## 2025-06-28 NOTE — ED INITIAL ASSESSMENT (HPI)
Left eyelid redness and swelling since yesterday AM. Crusty drainage this morning. May have occurred d/t spending day downtown in heat Thursday. Had blurred vision, dizziness Thursday, improved now.

## 2025-06-29 LAB
ATRIAL RATE: 57 BPM
P AXIS: 24 DEGREES
P-R INTERVAL: 144 MS
Q-T INTERVAL: 444 MS
QRS DURATION: 86 MS
QTC CALCULATION (BEZET): 432 MS
R AXIS: -22 DEGREES
T AXIS: 4 DEGREES
VENTRICULAR RATE: 57 BPM

## 2025-07-27 DIAGNOSIS — E78.2 MIXED HYPERLIPIDEMIA: ICD-10-CM

## 2025-07-27 DIAGNOSIS — I10 ESSENTIAL HYPERTENSION: ICD-10-CM

## 2025-07-29 RX ORDER — ATORVASTATIN CALCIUM 20 MG/1
20 TABLET, FILM COATED ORAL DAILY
Qty: 90 TABLET | Refills: 3 | Status: SHIPPED | OUTPATIENT
Start: 2025-07-29

## 2025-07-29 RX ORDER — LISINOPRIL AND HYDROCHLOROTHIAZIDE 12.5; 2 MG/1; MG/1
1 TABLET ORAL DAILY
Qty: 90 TABLET | Refills: 3 | Status: SHIPPED | OUTPATIENT
Start: 2025-07-29

## 2025-08-04 ENCOUNTER — NURSE TRIAGE (OUTPATIENT)
Dept: FAMILY MEDICINE CLINIC | Facility: CLINIC | Age: 70
End: 2025-08-04

## 2025-08-07 ENCOUNTER — OFFICE VISIT (OUTPATIENT)
Dept: FAMILY MEDICINE CLINIC | Facility: CLINIC | Age: 70
End: 2025-08-07

## 2025-08-07 VITALS
BODY MASS INDEX: 27 KG/M2 | SYSTOLIC BLOOD PRESSURE: 122 MMHG | DIASTOLIC BLOOD PRESSURE: 77 MMHG | WEIGHT: 151 LBS | HEART RATE: 60 BPM

## 2025-08-07 DIAGNOSIS — H10.13 ALLERGIC CONJUNCTIVITIS OF BOTH EYES: Primary | ICD-10-CM

## 2025-08-07 DIAGNOSIS — H01.136 EYELID ECZEMA, LEFT: ICD-10-CM

## 2025-08-07 PROCEDURE — 99213 OFFICE O/P EST LOW 20 MIN: CPT | Performed by: FAMILY MEDICINE

## 2025-08-07 RX ORDER — LORATADINE 10 MG/1
10 TABLET ORAL DAILY
Qty: 30 TABLET | Refills: 0 | Status: SHIPPED | OUTPATIENT
Start: 2025-08-07

## 2025-08-07 RX ORDER — OLOPATADINE HYDROCHLORIDE 1 MG/ML
1 SOLUTION OPHTHALMIC 2 TIMES DAILY
Qty: 5 ML | Refills: 1 | Status: SHIPPED | OUTPATIENT
Start: 2025-08-07

## 2025-08-07 RX ORDER — MOMETASONE FUROATE 1 MG/G
1 CREAM TOPICAL 2 TIMES DAILY PRN
Qty: 15 G | Refills: 0 | Status: SHIPPED | OUTPATIENT
Start: 2025-08-07

## (undated) DIAGNOSIS — N39.41 URGE INCONTINENCE: ICD-10-CM

## (undated) DIAGNOSIS — E11.65 TYPE 2 DIABETES MELLITUS WITH HYPERGLYCEMIA, WITHOUT LONG-TERM CURRENT USE OF INSULIN (HCC): ICD-10-CM

## (undated) DIAGNOSIS — S82.831D OTHER CLOSED FRACTURE OF PROXIMAL END OF RIGHT FIBULA WITH ROUTINE HEALING, SUBSEQUENT ENCOUNTER: ICD-10-CM

## (undated) DIAGNOSIS — E11.65 TYPE 2 DIABETES MELLITUS WITH HYPERGLYCEMIA, WITHOUT LONG-TERM CURRENT USE OF INSULIN (HCC): Primary | ICD-10-CM

## (undated) DEVICE — CONMED SCOPE SAVER BITE BLOCK, 20X27 MM: Brand: SCOPE SAVER

## (undated) DEVICE — 60 ML SYRINGE REGULAR TIP: Brand: MONOJECT

## (undated) DEVICE — KIT ENDO ORCAPOD 160/180/190

## (undated) DEVICE — FORCEPS BX L240CM DIA2.4MM L NDL RAD JAW 4

## (undated) DEVICE — KIT CLEAN ENDOKIT 1.1OZ GOWNX2

## (undated) DEVICE — Device: Brand: DUAL NARE NASAL CANNULAE FEMALE LUER CON 7FT O2 TUBE

## (undated) DEVICE — YANKAUER,BULB TIP,W/O VENT,RIGID,STERILE: Brand: MEDLINE

## (undated) NOTE — LETTER
11/22/21    Sowmya Faith  [address]      Dear Yissel Beard records indicate that you have outstanding lab work and or testing that was ordered for you and has not yet been completed:  Orders Placed This Encounter      Comp Metab

## (undated) NOTE — LETTER
[facility]  Administration of Anesthesia  IAlena agree to be cared for by a physician anesthesiologist alone and/or with a nurse anesthetist, who is specially trained to monitor me and give me medicine to put me to sleep or keep me comfortable during my procedure    I understand that my anesthesiologist and/or anesthetist is not an employee or agent of [employer] or [employer]. He or she works for [facility]    As the patient asking for anesthesia services, I agree to:  Allow the anesthesiologist (anesthesia doctor) to give me medicine and do additional procedures as necessary. Some examples are: Starting or using an “IV” to give me medicine, fluids or blood during my procedure, and having a breathing tube placed to help me breathe when I’m asleep (intubation). In the event that my heart stops working properly, I understand that my anesthesiologist will make every effort to sustain my life, unless otherwise directed by [facility] Do Not Resuscitate documents.  Tell my anesthesia doctor before my procedure:  If I am pregnant.  The last time that I ate or drank.  iii. All of the medicines I take (including prescriptions, herbal supplements, and pills I can buy without a prescription (including street drugs/illegal medications). Failure to inform my anesthesiologist about these medicines may increase my risk of anesthetic complications.  iv.If I am allergic to anything or have had a reaction to anesthesia before.  I understand how the anesthesia medicine will help me (benefits).  I understand that with any type of anesthesia medicine there are risks:  The most common risks are: nausea, vomiting, sore throat, muscle soreness, damage to my eyes, mouth, or teeth (from breathing tube placement).  Rare risks include: remembering what happened during my procedure, allergic reactions to medications, injury to my airway, heart, lungs, vision, nerves, or muscles  and in extremely rare instances death.  My doctor has explained to me other choices available to me for my care (alternatives).  Pregnant Patients (“epidural”):  I understand that the risks of having an epidural (medicine given into my back to help control pain during labor), include itching, low blood pressure, difficulty urinating, headache or slowing of the baby’s heart. Very rare risks include infection, bleeding, seizure, irregular heart rhythms and nerve injury.  Regional Anesthesia (“spinal”, “epidural”, & “nerve blocks”):  I understand that rare but potential complications include headache, bleeding, infection, seizure, irregular heart rhythms, and nerve injury.    _____________________________________________________________________________  Patient (or Representative) Signature/Relationship to Patient  Date   Time    _____________________________________________________________________________   Name (if used)    Language/Organization   Time    _____________________________________________________________________________  Nurse Anesthetist Signature     Date   Time  _____________________________________________________________________________  Anesthesiologist Signature     Date   Time  I have discussed the procedure and information above with the patient (or patient’s representative) and answered their questions. The patient or their representative has agreed to have anesthesia services.    _____________________________________________________________________________  Witness        Date   Time  I have verified that the signature is that of the patient or patient’s representative, and that it was signed before the procedure  Patient Name: Alena Mathur     : 1955                 Printed: 2023 at 12:13 PM    Medical Record #: [id number]                                            Page 1 of 1  ----------ANESTHESIA CONSENT----------

## (undated) NOTE — LETTER
[facility] [address], [place], [place]  Authorization for Surgical Operation and Procedure                                                                                           I hereby authorize Gay Edwards MD, my physician and his/her assistants (if applicable), which may include medical students, residents, and/or fellows, to perform the following surgical operation/ procedure and administer such anesthesia as may be determined necessary by my physician: Operation/Procedure name (s) ESOPHAGOGASTRODUODENOSCOPY on Alena Mathur   2.   I recognize that during the surgical operation/procedure, unforeseen conditions may necessitate additional or different procedures than those listed above.  I, therefore, further authorize and request that the above-named surgeon, assistants, or designees perform such procedures as are, in their judgment, necessary and desirable.    3.   My surgeon/physician has discussed prior to my surgery the potential benefits, risks and side effects of this procedure; the likelihood of achieving goals; and potential problems that might occur during recuperation.  They also discussed reasonable alternatives to the procedure, including risks, benefits, and side effects related to the alternatives and risks related to not receiving this procedure.  I have had all my questions answered and I acknowledge that no guarantee has been made as to the result that may be obtained.    4.   Should the need arise during my operation/procedure, which includes change of level of care prior to discharge, I also consent to the administration of blood and/or blood products.  Further, I understand that despite careful testing and screening of blood or blood products by collecting agencies, I may still be subject to ill effects as a result of receiving a blood transfusion and/or blood products.  The following are some, but not all, of the potential risks that can occur:  fever and allergic reactions, hemolytic reactions, transmission of diseases such as Hepatitis, AIDS and Cytomegalovirus (CMV) and fluid overload.  In the event that I wish to have an autologous transfusion of my own blood, or a directed donor transfusion, I will discuss this with my physician.  Check only if Refusing Blood or Blood Products  I understand refusal of blood or blood products as deemed necessary by my physician may have serious consequences to my condition to include possible death. I hereby assume responsibility for my refusal and release the hospital, its personnel, and my physicians from any responsibility for the consequences of my refusal.    o  Refuse   5.   I authorize the use of any specimen, organs, tissues, body parts or foreign objects that may be removed from my body during the operation/procedure for diagnosis, research or teaching purposes and their subsequent disposal by hospital authorities.  I also authorize the release of specimen test results and/or written reports to my treating physician on the hospital medical staff or other referring or consulting physicians involved in my care, at the discretion of the Pathologist or my treating physician.    6.   I consent to the photographing or videotaping of the operations or procedures to be performed, including appropriate portions of my body for medical, scientific, or educational purposes, provided my identity is not revealed by the pictures or by descriptive texts accompanying them.  If the procedure has been photographed/videotaped, the surgeon will obtain the original picture, image, videotape or CD.  The hospital will not be responsible for storage, release or maintenance of the picture, image, tape or CD.    7.   I consent to the presence of a  or observers in the operating room as deemed necessary by my physician or their designees.    8.   I recognize that in the event my procedure results in extended  X-Ray/fluoroscopy time, I may develop a skin reaction.    9. If I have a Do Not Attempt Resuscitation (DNAR) order in place, that status will be suspended while in the operating room, procedural suite, and during the recovery period unless otherwise explicitly stated by me (or a person authorized to consent on my behalf). The surgeon or my attending physician will determine when the applicable recovery period ends for purposes of reinstating the DNAR order.  10. Patients having a sterilization procedure: I understand that if the procedure is successful the results will be permanent and it will therefore be impossible for me to inseminate, conceive, or bear children.  I also understand that the procedure is intended to result in sterility, although the result has not been guaranteed.   11. I acknowledge that my physician has explained sedation/analgesia administration to me including the risk and benefits I consent to the administration of sedation/analgesia as may be necessary or desirable in the judgment of my physician.    I CERTIFY THAT I HAVE READ AND FULLY UNDERSTAND THE ABOVE CONSENT TO OPERATION and/or OTHER PROCEDURE.     _________________________________________ _________________________________     ___________________________________  Signature of Patient     Signature of Responsible Person                   Printed Name of Responsible Person                              _________________________________________ ______________________________        ___________________________________  Signature of Witness         Date  Time         Relationship to Patient    STATEMENT OF PHYSICIAN My signature below affirms that prior to the time of the procedure; I have explained to the patient and/or his/her legal representative, the risks and benefits involved in the proposed treatment and any reasonable alternative to the proposed treatment. I have also explained the risks and benefits involved in refusal of the  proposed treatment and alternatives to the proposed treatment and have answered the patient's questions. If I have a significant financial interest in a co-management agreement or a significant financial interest in any product or implant, or other significant relationship used in this procedure/surgery, I have disclosed this and had a discussion with my patient.     _______________________________________________________________ _____________________________  (Signature of Physician)                                                                                         (Date)                                   (Time)  Patient Name: lAena Mathur    : 1955   Printed: 2023      Medical Record #: [id number]                                              Page 1 of 1